# Patient Record
Sex: FEMALE | Race: WHITE | NOT HISPANIC OR LATINO | Employment: OTHER | URBAN - METROPOLITAN AREA
[De-identification: names, ages, dates, MRNs, and addresses within clinical notes are randomized per-mention and may not be internally consistent; named-entity substitution may affect disease eponyms.]

---

## 2023-10-26 ENCOUNTER — OFFICE VISIT (OUTPATIENT)
Dept: SURGICAL ONCOLOGY | Facility: CLINIC | Age: 68
End: 2023-10-26
Payer: COMMERCIAL

## 2023-10-26 VITALS
TEMPERATURE: 97.9 F | OXYGEN SATURATION: 99 % | HEART RATE: 68 BPM | BODY MASS INDEX: 24.34 KG/M2 | SYSTOLIC BLOOD PRESSURE: 135 MMHG | WEIGHT: 137.35 LBS | RESPIRATION RATE: 16 BRPM | HEIGHT: 63 IN | DIASTOLIC BLOOD PRESSURE: 83 MMHG

## 2023-10-26 DIAGNOSIS — C43.71: ICD-10-CM

## 2023-10-26 PROCEDURE — 1036F TOBACCO NON-USER: CPT | Performed by: SURGERY

## 2023-10-26 PROCEDURE — 99205 OFFICE O/P NEW HI 60 MIN: CPT | Performed by: SURGERY

## 2023-10-26 PROCEDURE — 99215 OFFICE O/P EST HI 40 MIN: CPT | Performed by: SURGERY

## 2023-10-26 PROCEDURE — 1126F AMNT PAIN NOTED NONE PRSNT: CPT | Performed by: SURGERY

## 2023-10-26 RX ORDER — CALCIUM CARBONATE 500(1250)
1 TABLET ORAL
COMMUNITY

## 2023-10-26 RX ORDER — BISMUTH SUBSALICYLATE 262 MG
1 TABLET,CHEWABLE ORAL DAILY
COMMUNITY

## 2023-10-26 RX ORDER — AMLODIPINE BESYLATE 10 MG/1
1 TABLET ORAL DAILY
COMMUNITY

## 2023-10-26 RX ORDER — METOPROLOL SUCCINATE 50 MG/1
50 TABLET, EXTENDED RELEASE ORAL DAILY
COMMUNITY

## 2023-10-26 ASSESSMENT — ENCOUNTER SYMPTOMS
VOMITING: 0
WOUND: 1
WEAKNESS: 0
DYSURIA: 0
ENDOCRINE NEGATIVE: 1
CONSTITUTIONAL NEGATIVE: 1
ADENOPATHY: 0
HEADACHES: 0
NERVOUS/ANXIOUS: 1
FLANK PAIN: 0
LOSS OF SENSATION IN FEET: 0
SORE THROAT: 0
EYE DISCHARGE: 0
ABDOMINAL PAIN: 0
SHORTNESS OF BREATH: 0
DEPRESSION: 0
NAUSEA: 0
OCCASIONAL FEELINGS OF UNSTEADINESS: 0
COLOR CHANGE: 1
HALLUCINATIONS: 0
CONFUSION: 0
SPEECH DIFFICULTY: 0

## 2023-10-26 ASSESSMENT — PAIN SCALES - GENERAL: PAINLEVEL: 0-NO PAIN
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
## 2023-10-27 ENCOUNTER — LAB REQUISITION (OUTPATIENT)
Dept: LAB | Facility: HOSPITAL | Age: 68
End: 2023-10-27
Payer: COMMERCIAL

## 2023-10-27 DIAGNOSIS — L98.9 DISORDER OF THE SKIN AND SUBCUTANEOUS TISSUE, UNSPECIFIED: ICD-10-CM

## 2023-10-27 PROCEDURE — 88321 CONSLTJ&REPRT SLD PREP ELSWR: CPT | Performed by: DERMATOLOGY

## 2023-10-27 PROCEDURE — 88321 CONSLTJ&REPRT SLD PREP ELSWR: CPT

## 2023-10-31 LAB
PATH REPORT.FINAL DX SPEC: NORMAL
PATH REPORT.GROSS SPEC: NORMAL
PATH REPORT.RELEVANT HX SPEC: NORMAL
PATH REPORT.TOTAL CANCER: NORMAL
PATHOLOGY SYNOPTIC REPORT: NORMAL

## 2023-11-01 DIAGNOSIS — C43.9 MELANOMA OF SKIN (MULTI): Primary | ICD-10-CM

## 2023-11-01 NOTE — TUMOR BOARD NOTE
General Patient Information  Name:  Ciara Montes De Oca  Evaluation #:  1  Conference Date:  11-6-2023  YOB: 1955  MRN:  [id number]  Program Physician(s):  Dillon Torrez  Referring Physician(s):  Oh Arciniega (Podiatry), Krzysztof Garcia      Summary   Stage:  cIIA ([id number])   Melanoma 5 year survival: 94%    Assessment:  Right plantar heel with an ulcerated acral melanoma, Breslow: at least 1.3 mm, broadly transected on the deep margin.    Recommendation:  WLE with 1-2 cm margins and SLNB.  Consider 2 cm as broadly transected.  Not eligible for [id number] study due to history of breast cancer.    Review Multidisciplinary Cutaneous Oncology Conference recommendation with patient.  Continue routine follow up and total body skin exams with Dermatology.    Follow Up:  Oh Arciniega (Podiatry), Krzysztof Garcia, Dermatology      History and Physical Exam  Dermatologic History:   68 y.o. female with a biopsy of the right plantar heel on 10- showing an ulcerated melanoma, acral type, Breslow: at least 1.3 mm, broadly transected on the deep margin.    Past Medical History:  Breast cancer in 2010    Family History of DNS/MM:  Unknown    Skin:  There is a large darkly discolored lesion encompassing the plantar aspect of the heel with a fungating bleeding mass.    Lymphatic:  No palpable lymph nodes in the right inguinal or popliteal regions.      Pathology  Derm Consult: [id number]  4 SLIDES, [facility], [id number], 10/16/23     SKIN AND SUBCUTANEOUS TISSUE, RIGHT PLANTAR HEEL, BIOPSY:  ULCER AND MALIGNANT MELANOMA, BRESLOW THICKNESS AT LEAST 1.3 MM, PRESENT ON THE DEEP AND PERIPHERAL MARGIN, SEE NOTE.     Note: Microscopic examination reveals a specimen that extends into the dermis. There is an ulcer and there are nests of atypical epithelioid cells with melanin pigmentation. These cells stain strongly with antibodies against SOX-10 and Melan-A, and the pigment stains with a  [place] stain.     ** Electronically signed out by Gaudencio Woods MD **     MELANOMA OF THE SKIN: Biopsy   8th Edition - Protocol posted: 3/23/2022MELANOMA OF THE SKIN: BIOPSY - All Specimens  SPECIMEN   Procedure  Not specified   Specimen Laterality  Right   TUMOR   Tumor Site  Skin of lower limb and hip: Right plantar heel        Histologic Type  Acral melanoma   Maximum Tumor (Breslow) Thickness (Millimeters)  At least: 1.3 mm     Broadly transected on the deep margin.   Ulceration  Present   Extent of Ulceration (Millimeters)  4.5 mm   Anatomic ([place]) Level  At least level: IV     Broadly transected on the deep margin.   Mitotic Rate  2 mitoses per mm2   Microsatellite(s)  Not identified   Lymphovascular Invasion  Not identified   Neurotropism  Not identified   Tumor-Infiltrating Lymphocytes  Present, nonbrisk   Tumor Regression  Not identified   MARGINS     Margin Status for Invasive Melanoma  Invasive melanoma present at margin   Margin(s) Involved by Invasive Melanoma  Peripheral     Deep   Margin Status for Melanoma in situ  All margins negative for melanoma in situ   PATHOLOGIC STAGE CLASSIFICATION (pTNM, AJCC 8th Edition)     pT Category  pT2b

## 2023-11-06 ENCOUNTER — TUMOR BOARD CONFERENCE (OUTPATIENT)
Dept: HEMATOLOGY/ONCOLOGY | Facility: HOSPITAL | Age: 68
End: 2023-11-06
Payer: COMMERCIAL

## 2023-11-16 ENCOUNTER — PREP FOR PROCEDURE (OUTPATIENT)
Dept: SURGICAL ONCOLOGY | Facility: HOSPITAL | Age: 68
End: 2023-11-16
Payer: COMMERCIAL

## 2023-11-16 DIAGNOSIS — C43.71: Primary | ICD-10-CM

## 2023-11-16 DIAGNOSIS — C43.71: ICD-10-CM

## 2023-11-16 RX ORDER — CEFAZOLIN SODIUM 2 G/100ML
2 INJECTION, SOLUTION INTRAVENOUS EVERY 8 HOURS
Status: CANCELLED | OUTPATIENT
Start: 2023-11-16

## 2023-11-16 RX ORDER — HEPARIN SODIUM 5000 [USP'U]/ML
5000 INJECTION, SOLUTION INTRAVENOUS; SUBCUTANEOUS ONCE
Status: CANCELLED | OUTPATIENT
Start: 2023-11-16 | End: 2023-11-16

## 2023-11-16 NOTE — H&P
Since her visit with me, the patient underwent MRI brain and PET scan which showed no evidence of distant metastatic disease.  I also discussed her case with Dr. Jose Gutierrez regarding any reconstructive options, and he recommended given the location that a more formal reconstruction be performed rather than just allowing for secondary intention healing.  Therefore, we will plan for wide excision and sentinel lymph node biopsy on 11/22/2023, and in the same setting Dr. Gutierrez will do the first phase of the 2 phase reconstruction.  We will also arrange clinic appointment with Dr. Gutierrez.      Juve Millan MD

## 2023-11-17 DIAGNOSIS — C43.71: ICD-10-CM

## 2023-11-20 ENCOUNTER — OFFICE VISIT (OUTPATIENT)
Dept: PLASTIC SURGERY | Facility: CLINIC | Age: 68
End: 2023-11-20
Payer: COMMERCIAL

## 2023-11-20 VITALS
HEIGHT: 62 IN | BODY MASS INDEX: 25.58 KG/M2 | WEIGHT: 139 LBS | HEART RATE: 68 BPM | DIASTOLIC BLOOD PRESSURE: 85 MMHG | SYSTOLIC BLOOD PRESSURE: 154 MMHG

## 2023-11-20 DIAGNOSIS — C43.71: Primary | ICD-10-CM

## 2023-11-20 PROCEDURE — 1159F MED LIST DOCD IN RCRD: CPT | Performed by: SURGERY

## 2023-11-20 PROCEDURE — 1036F TOBACCO NON-USER: CPT | Performed by: SURGERY

## 2023-11-20 PROCEDURE — 1126F AMNT PAIN NOTED NONE PRSNT: CPT | Performed by: SURGERY

## 2023-11-20 PROCEDURE — 99215 OFFICE O/P EST HI 40 MIN: CPT | Performed by: SURGERY
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
## 2023-11-21 ENCOUNTER — ANESTHESIA EVENT (OUTPATIENT)
Dept: OPERATING ROOM | Facility: HOSPITAL | Age: 68
End: 2023-11-21
Payer: COMMERCIAL

## 2023-11-21 DIAGNOSIS — C43.71: ICD-10-CM

## 2023-11-22 ENCOUNTER — HOSPITAL ENCOUNTER (OUTPATIENT)
Facility: HOSPITAL | Age: 68
Setting detail: OUTPATIENT SURGERY
Discharge: HOME | End: 2023-11-22
Attending: SURGERY | Admitting: SURGERY
Payer: COMMERCIAL

## 2023-11-22 ENCOUNTER — HOSPITAL ENCOUNTER (OUTPATIENT)
Dept: RADIOLOGY | Facility: HOSPITAL | Age: 68
Setting detail: OUTPATIENT SURGERY
Discharge: HOME | End: 2023-11-22
Payer: COMMERCIAL

## 2023-11-22 ENCOUNTER — ANESTHESIA (OUTPATIENT)
Dept: OPERATING ROOM | Facility: HOSPITAL | Age: 68
End: 2023-11-22
Payer: COMMERCIAL

## 2023-11-22 VITALS
BODY MASS INDEX: 25.56 KG/M2 | TEMPERATURE: 96.3 F | WEIGHT: 138.89 LBS | SYSTOLIC BLOOD PRESSURE: 137 MMHG | OXYGEN SATURATION: 95 % | DIASTOLIC BLOOD PRESSURE: 76 MMHG | HEIGHT: 62 IN | RESPIRATION RATE: 14 BRPM | HEART RATE: 70 BPM

## 2023-11-22 DIAGNOSIS — C43.71: ICD-10-CM

## 2023-11-22 DIAGNOSIS — C43.71: Primary | ICD-10-CM

## 2023-11-22 PROBLEM — I10 PRIMARY HYPERTENSION: Status: ACTIVE | Noted: 2023-11-22

## 2023-11-22 LAB
ABO GROUP (TYPE) IN BLOOD: NORMAL
ABO GROUP (TYPE) IN BLOOD: NORMAL
ANION GAP BLDA CALCULATED.4IONS-SCNC: 8 MMO/L (ref 10–25)
ANTIBODY SCREEN: NORMAL
ANTIBODY SCREEN: NORMAL
BASE EXCESS BLDA CALC-SCNC: -0.2 MMOL/L (ref -2–3)
BODY TEMPERATURE: 37 DEGREES CELSIUS
CA-I BLDA-SCNC: 1.29 MMOL/L (ref 1.1–1.33)
CHLORIDE BLDA-SCNC: 108 MMOL/L (ref 98–107)
GLUCOSE BLDA-MCNC: 105 MG/DL (ref 74–99)
HCO3 BLDA-SCNC: 24.8 MMOL/L (ref 22–26)
HCT VFR BLD EST: 36 % (ref 36–46)
HGB BLDA-MCNC: 12 G/DL (ref 12–16)
INHALED O2 CONCENTRATION: 50 %
LACTATE BLDA-SCNC: 1 MMOL/L (ref 0.4–2)
OXYHGB MFR BLDA: 97.8 % (ref 94–98)
PCO2 BLDA: 41 MM HG (ref 38–42)
PH BLDA: 7.39 PH (ref 7.38–7.42)
PO2 BLDA: 171 MM HG (ref 85–95)
POTASSIUM BLDA-SCNC: 3.7 MMOL/L (ref 3.5–5.3)
RH FACTOR (ANTIGEN D): NORMAL
RH FACTOR (ANTIGEN D): NORMAL
SAO2 % BLDA: 100 % (ref 94–100)
SODIUM BLDA-SCNC: 137 MMOL/L (ref 136–145)

## 2023-11-22 PROCEDURE — A11606 PR EXC SKIN MALIG >4 CM TRUNK,ARM,LEG: Performed by: STUDENT IN AN ORGANIZED HEALTH CARE EDUCATION/TRAINING PROGRAM

## 2023-11-22 PROCEDURE — 84132 ASSAY OF SERUM POTASSIUM: CPT

## 2023-11-22 PROCEDURE — 78830 RP LOCLZJ TUM SPECT W/CT 1: CPT

## 2023-11-22 PROCEDURE — 86901 BLOOD TYPING SEROLOGIC RH(D): CPT | Performed by: SURGERY

## 2023-11-22 PROCEDURE — 38500 BIOPSY/REMOVAL LYMPH NODES: CPT | Performed by: SURGERY

## 2023-11-22 PROCEDURE — 64708 REVISE ARM/LEG NERVE: CPT | Performed by: SURGERY

## 2023-11-22 PROCEDURE — A4217 STERILE WATER/SALINE, 500 ML: HCPCS | Performed by: SURGERY

## 2023-11-22 PROCEDURE — 3700000001 HC GENERAL ANESTHESIA TIME - INITIAL BASE CHARGE: Performed by: SURGERY

## 2023-11-22 PROCEDURE — 86900 BLOOD TYPING SEROLOGIC ABO: CPT | Performed by: STUDENT IN AN ORGANIZED HEALTH CARE EDUCATION/TRAINING PROGRAM

## 2023-11-22 PROCEDURE — 36415 COLL VENOUS BLD VENIPUNCTURE: CPT | Performed by: STUDENT IN AN ORGANIZED HEALTH CARE EDUCATION/TRAINING PROGRAM

## 2023-11-22 PROCEDURE — 2500000004 HC RX 250 GENERAL PHARMACY W/ HCPCS (ALT 636 FOR OP/ED): Performed by: SURGERY

## 2023-11-22 PROCEDURE — 78195 LYMPH SYSTEM IMAGING: CPT | Mod: 59

## 2023-11-22 PROCEDURE — 15572 SKIN PEDICLE FLAP ARMS/LEGS: CPT | Performed by: SURGERY

## 2023-11-22 PROCEDURE — 88341 IMHCHEM/IMCYTCHM EA ADD ANTB: CPT | Performed by: PATHOLOGY

## 2023-11-22 PROCEDURE — 96372 THER/PROPH/DIAG INJ SC/IM: CPT | Performed by: SURGERY

## 2023-11-22 PROCEDURE — 88307 TISSUE EXAM BY PATHOLOGIST: CPT | Mod: TC,SUR | Performed by: SURGERY

## 2023-11-22 PROCEDURE — 88342 IMHCHEM/IMCYTCHM 1ST ANTB: CPT | Performed by: PATHOLOGY

## 2023-11-22 PROCEDURE — 3700000002 HC GENERAL ANESTHESIA TIME - EACH INCREMENTAL 1 MINUTE: Performed by: SURGERY

## 2023-11-22 PROCEDURE — 88307 TISSUE EXAM BY PATHOLOGIST: CPT | Performed by: PATHOLOGY

## 2023-11-22 PROCEDURE — 38531 OPEN BX/EXC INGUINOFEM NODES: CPT | Performed by: SURGERY

## 2023-11-22 PROCEDURE — 7100000009 HC PHASE TWO TIME - INITIAL BASE CHARGE: Performed by: SURGERY

## 2023-11-22 PROCEDURE — 7100000010 HC PHASE TWO TIME - EACH INCREMENTAL 1 MINUTE: Performed by: SURGERY

## 2023-11-22 PROCEDURE — 2780000003 HC OR 278 NO HCPCS: Performed by: SURGERY

## 2023-11-22 PROCEDURE — 7100000002 HC RECOVERY ROOM TIME - EACH INCREMENTAL 1 MINUTE: Performed by: SURGERY

## 2023-11-22 PROCEDURE — 2500000004 HC RX 250 GENERAL PHARMACY W/ HCPCS (ALT 636 FOR OP/ED)

## 2023-11-22 PROCEDURE — 3600000003 HC OR TIME - INITIAL BASE CHARGE - PROCEDURE LEVEL THREE: Performed by: SURGERY

## 2023-11-22 PROCEDURE — 88305 TISSUE EXAM BY PATHOLOGIST: CPT | Performed by: PATHOLOGY

## 2023-11-22 PROCEDURE — 2500000005 HC RX 250 GENERAL PHARMACY W/O HCPCS

## 2023-11-22 PROCEDURE — 3430000001 HC RX 343 DIAGNOSTIC RADIOPHARMACEUTICALS: Performed by: SURGERY

## 2023-11-22 PROCEDURE — 2720000007 HC OR 272 NO HCPCS: Performed by: SURGERY

## 2023-11-22 PROCEDURE — 7100000001 HC RECOVERY ROOM TIME - INITIAL BASE CHARGE: Performed by: SURGERY

## 2023-11-22 PROCEDURE — 78830 RP LOCLZJ TUM SPECT W/CT 1: CPT | Performed by: STUDENT IN AN ORGANIZED HEALTH CARE EDUCATION/TRAINING PROGRAM

## 2023-11-22 PROCEDURE — 15276 SKIN SUB GRAFT F/N/HF/G ADDL: CPT | Performed by: SURGERY

## 2023-11-22 PROCEDURE — 15738 MUSCLE-SKIN GRAFT LEG: CPT | Performed by: SURGERY

## 2023-11-22 PROCEDURE — 38900 IO MAP OF SENT LYMPH NODE: CPT | Performed by: SURGERY

## 2023-11-22 PROCEDURE — 3600000008 HC OR TIME - EACH INCREMENTAL 1 MINUTE - PROCEDURE LEVEL THREE: Performed by: SURGERY

## 2023-11-22 PROCEDURE — A9520 TC99 TILMANOCEPT DIAG 0.5MCI: HCPCS | Performed by: SURGERY

## 2023-11-22 PROCEDURE — 2500000005 HC RX 250 GENERAL PHARMACY W/O HCPCS: Performed by: STUDENT IN AN ORGANIZED HEALTH CARE EDUCATION/TRAINING PROGRAM

## 2023-11-22 PROCEDURE — 15275 SKIN SUB GRAFT FACE/NK/HF/G: CPT | Performed by: SURGERY

## 2023-11-22 PROCEDURE — 11606 EXC TR-EXT MAL+MARG >4 CM: CPT | Performed by: SURGERY

## 2023-11-22 PROCEDURE — 2500000001 HC RX 250 WO HCPCS SELF ADMINISTERED DRUGS (ALT 637 FOR MEDICARE OP)

## 2023-11-22 PROCEDURE — 36620 INSERTION CATHETER ARTERY: CPT

## 2023-11-22 PROCEDURE — 2500000004 HC RX 250 GENERAL PHARMACY W/ HCPCS (ALT 636 FOR OP/ED): Performed by: STUDENT IN AN ORGANIZED HEALTH CARE EDUCATION/TRAINING PROGRAM

## 2023-11-22 DEVICE — INTEGRA® MESHED BILAYER WOUND MATRIX 8 IN*10 IN (20 CM*25 CM)
Type: IMPLANTABLE DEVICE | Site: HEEL | Status: FUNCTIONAL
Brand: INTEGRA®

## 2023-11-22 RX ORDER — HEPARIN SODIUM 5000 [USP'U]/ML
5000 INJECTION, SOLUTION INTRAVENOUS; SUBCUTANEOUS ONCE
Status: COMPLETED | OUTPATIENT
Start: 2023-11-22 | End: 2023-11-22

## 2023-11-22 RX ORDER — LABETALOL HYDROCHLORIDE 5 MG/ML
5 INJECTION, SOLUTION INTRAVENOUS ONCE AS NEEDED
Status: DISCONTINUED | OUTPATIENT
Start: 2023-11-22 | End: 2023-11-22

## 2023-11-22 RX ORDER — ACETAMINOPHEN 325 MG/1
650 TABLET ORAL EVERY 4 HOURS PRN
Qty: 60 TABLET | Refills: 0 | Status: SHIPPED | OUTPATIENT
Start: 2023-11-22 | End: 2023-12-07

## 2023-11-22 RX ORDER — FENTANYL CITRATE 50 UG/ML
INJECTION, SOLUTION INTRAMUSCULAR; INTRAVENOUS AS NEEDED
Status: DISCONTINUED | OUTPATIENT
Start: 2023-11-22 | End: 2023-11-22

## 2023-11-22 RX ORDER — HYDROMORPHONE HYDROCHLORIDE 1 MG/ML
0.2 INJECTION, SOLUTION INTRAMUSCULAR; INTRAVENOUS; SUBCUTANEOUS EVERY 5 MIN PRN
Status: DISCONTINUED | OUTPATIENT
Start: 2023-11-22 | End: 2023-11-22 | Stop reason: HOSPADM

## 2023-11-22 RX ORDER — ONDANSETRON HYDROCHLORIDE 2 MG/ML
INJECTION, SOLUTION INTRAVENOUS AS NEEDED
Status: DISCONTINUED | OUTPATIENT
Start: 2023-11-22 | End: 2023-11-22

## 2023-11-22 RX ORDER — SODIUM CHLORIDE 0.9 G/100ML
IRRIGANT IRRIGATION AS NEEDED
Status: DISCONTINUED | OUTPATIENT
Start: 2023-11-22 | End: 2023-11-22 | Stop reason: HOSPADM

## 2023-11-22 RX ORDER — SODIUM CHLORIDE, SODIUM LACTATE, POTASSIUM CHLORIDE, CALCIUM CHLORIDE 600; 310; 30; 20 MG/100ML; MG/100ML; MG/100ML; MG/100ML
INJECTION, SOLUTION INTRAVENOUS CONTINUOUS PRN
Status: DISCONTINUED | OUTPATIENT
Start: 2023-11-22 | End: 2023-11-22

## 2023-11-22 RX ORDER — LIDOCAINE HYDROCHLORIDE 20 MG/ML
INJECTION, SOLUTION INFILTRATION; PERINEURAL AS NEEDED
Status: DISCONTINUED | OUTPATIENT
Start: 2023-11-22 | End: 2023-11-22

## 2023-11-22 RX ORDER — HYDROMORPHONE HYDROCHLORIDE 1 MG/ML
0.1 INJECTION, SOLUTION INTRAMUSCULAR; INTRAVENOUS; SUBCUTANEOUS EVERY 5 MIN PRN
Status: DISCONTINUED | OUTPATIENT
Start: 2023-11-22 | End: 2023-11-22 | Stop reason: HOSPADM

## 2023-11-22 RX ORDER — DEXAMETHASONE SODIUM PHOSPHATE 4 MG/ML
INJECTION, SOLUTION INTRA-ARTICULAR; INTRALESIONAL; INTRAMUSCULAR; INTRAVENOUS; SOFT TISSUE AS NEEDED
Status: DISCONTINUED | OUTPATIENT
Start: 2023-11-22 | End: 2023-11-22

## 2023-11-22 RX ORDER — MIDAZOLAM HYDROCHLORIDE 1 MG/ML
INJECTION, SOLUTION INTRAMUSCULAR; INTRAVENOUS AS NEEDED
Status: DISCONTINUED | OUTPATIENT
Start: 2023-11-22 | End: 2023-11-22

## 2023-11-22 RX ORDER — ROCURONIUM BROMIDE 10 MG/ML
INJECTION, SOLUTION INTRAVENOUS AS NEEDED
Status: DISCONTINUED | OUTPATIENT
Start: 2023-11-22 | End: 2023-11-22

## 2023-11-22 RX ORDER — LIDOCAINE HYDROCHLORIDE 10 MG/ML
0.1 INJECTION INFILTRATION; PERINEURAL ONCE
Status: DISCONTINUED | OUTPATIENT
Start: 2023-11-22 | End: 2023-11-22 | Stop reason: HOSPADM

## 2023-11-22 RX ORDER — PROPOFOL 10 MG/ML
INJECTION, EMULSION INTRAVENOUS AS NEEDED
Status: DISCONTINUED | OUTPATIENT
Start: 2023-11-22 | End: 2023-11-22

## 2023-11-22 RX ORDER — PHENYLEPHRINE HCL IN 0.9% NACL 0.4MG/10ML
SYRINGE (ML) INTRAVENOUS AS NEEDED
Status: DISCONTINUED | OUTPATIENT
Start: 2023-11-22 | End: 2023-11-22

## 2023-11-22 RX ORDER — OXYCODONE HYDROCHLORIDE 5 MG/1
5 TABLET ORAL EVERY 4 HOURS PRN
Qty: 15 TABLET | Refills: 0 | Status: SHIPPED | OUTPATIENT
Start: 2023-11-22 | End: 2023-11-29

## 2023-11-22 RX ORDER — BUPIVACAINE HYDROCHLORIDE 5 MG/ML
INJECTION, SOLUTION EPIDURAL; INTRACAUDAL AS NEEDED
Status: DISCONTINUED | OUTPATIENT
Start: 2023-11-22 | End: 2023-11-22 | Stop reason: HOSPADM

## 2023-11-22 RX ORDER — HYDRALAZINE HYDROCHLORIDE 20 MG/ML
5 INJECTION INTRAMUSCULAR; INTRAVENOUS EVERY 30 MIN PRN
Status: DISCONTINUED | OUTPATIENT
Start: 2023-11-22 | End: 2023-11-22 | Stop reason: HOSPADM

## 2023-11-22 RX ORDER — HYDROMORPHONE HYDROCHLORIDE 1 MG/ML
0.5 INJECTION, SOLUTION INTRAMUSCULAR; INTRAVENOUS; SUBCUTANEOUS EVERY 5 MIN PRN
Status: DISCONTINUED | OUTPATIENT
Start: 2023-11-22 | End: 2023-11-22 | Stop reason: HOSPADM

## 2023-11-22 RX ORDER — SODIUM CHLORIDE, SODIUM LACTATE, POTASSIUM CHLORIDE, CALCIUM CHLORIDE 600; 310; 30; 20 MG/100ML; MG/100ML; MG/100ML; MG/100ML
100 INJECTION, SOLUTION INTRAVENOUS CONTINUOUS
Status: DISCONTINUED | OUTPATIENT
Start: 2023-11-22 | End: 2023-11-22 | Stop reason: HOSPADM

## 2023-11-22 RX ORDER — CEFAZOLIN SODIUM 2 G/100ML
2 INJECTION, SOLUTION INTRAVENOUS EVERY 8 HOURS
Status: DISCONTINUED | OUTPATIENT
Start: 2023-11-22 | End: 2023-11-22 | Stop reason: HOSPADM

## 2023-11-22 RX ORDER — ONDANSETRON HYDROCHLORIDE 2 MG/ML
4 INJECTION, SOLUTION INTRAVENOUS ONCE AS NEEDED
Status: DISCONTINUED | OUTPATIENT
Start: 2023-11-22 | End: 2023-11-22 | Stop reason: HOSPADM

## 2023-11-22 RX ORDER — HYDROMORPHONE HYDROCHLORIDE 1 MG/ML
INJECTION, SOLUTION INTRAMUSCULAR; INTRAVENOUS; SUBCUTANEOUS AS NEEDED
Status: DISCONTINUED | OUTPATIENT
Start: 2023-11-22 | End: 2023-11-22

## 2023-11-22 RX ORDER — ALBUTEROL SULFATE 0.83 MG/ML
2.5 SOLUTION RESPIRATORY (INHALATION) ONCE AS NEEDED
Status: DISCONTINUED | OUTPATIENT
Start: 2023-11-22 | End: 2023-11-22 | Stop reason: HOSPADM

## 2023-11-22 RX ORDER — OXYCODONE HYDROCHLORIDE 5 MG/1
5 TABLET ORAL ONCE
Status: COMPLETED | OUTPATIENT
Start: 2023-11-22 | End: 2023-11-22

## 2023-11-22 RX ORDER — CEFAZOLIN 1 G/1
INJECTION, POWDER, FOR SOLUTION INTRAVENOUS AS NEEDED
Status: DISCONTINUED | OUTPATIENT
Start: 2023-11-22 | End: 2023-11-22

## 2023-11-22 RX ADMIN — ONDANSETRON 4 MG: 2 INJECTION, SOLUTION INTRAMUSCULAR; INTRAVENOUS at 16:26

## 2023-11-22 RX ADMIN — Medication 40 MCG: at 12:52

## 2023-11-22 RX ADMIN — ROCURONIUM BROMIDE 50 MG: 10 INJECTION, SOLUTION INTRAVENOUS at 11:48

## 2023-11-22 RX ADMIN — PROPOFOL 20 MG: 10 INJECTION, EMULSION INTRAVENOUS at 14:30

## 2023-11-22 RX ADMIN — FENTANYL CITRATE 75 MCG: 50 INJECTION, SOLUTION INTRAMUSCULAR; INTRAVENOUS at 11:48

## 2023-11-22 RX ADMIN — FENTANYL CITRATE 25 MCG: 50 INJECTION, SOLUTION INTRAMUSCULAR; INTRAVENOUS at 13:56

## 2023-11-22 RX ADMIN — ROCURONIUM BROMIDE 20 MG: 10 INJECTION, SOLUTION INTRAVENOUS at 15:14

## 2023-11-22 RX ADMIN — ROCURONIUM BROMIDE 10 MG: 10 INJECTION, SOLUTION INTRAVENOUS at 13:03

## 2023-11-22 RX ADMIN — HYDROMORPHONE HYDROCHLORIDE 0.5 MG: 1 INJECTION, SOLUTION INTRAMUSCULAR; INTRAVENOUS; SUBCUTANEOUS at 18:31

## 2023-11-22 RX ADMIN — HYDROMORPHONE HYDROCHLORIDE 0.2 MG: 1 INJECTION, SOLUTION INTRAMUSCULAR; INTRAVENOUS; SUBCUTANEOUS at 15:33

## 2023-11-22 RX ADMIN — Medication 25 MG: at 14:02

## 2023-11-22 RX ADMIN — SUGAMMADEX 200 MG: 100 INJECTION, SOLUTION INTRAVENOUS at 16:40

## 2023-11-22 RX ADMIN — FENTANYL CITRATE 25 MCG: 50 INJECTION, SOLUTION INTRAMUSCULAR; INTRAVENOUS at 08:43

## 2023-11-22 RX ADMIN — Medication 80 MCG: at 16:04

## 2023-11-22 RX ADMIN — PROPOFOL 10 MG: 10 INJECTION, EMULSION INTRAVENOUS at 13:57

## 2023-11-22 RX ADMIN — LIDOCAINE HYDROCHLORIDE 60 ML: 20 INJECTION, SOLUTION INFILTRATION; PERINEURAL at 11:48

## 2023-11-22 RX ADMIN — TILMANOCEPT 511 MILLICURIE: KIT at 08:58

## 2023-11-22 RX ADMIN — PROPOFOL 40 MG: 10 INJECTION, EMULSION INTRAVENOUS at 08:44

## 2023-11-22 RX ADMIN — FENTANYL CITRATE 25 MCG: 50 INJECTION, SOLUTION INTRAMUSCULAR; INTRAVENOUS at 08:39

## 2023-11-22 RX ADMIN — HYDROMORPHONE HYDROCHLORIDE 0.2 MG: 1 INJECTION, SOLUTION INTRAMUSCULAR; INTRAVENOUS; SUBCUTANEOUS at 15:10

## 2023-11-22 RX ADMIN — PROPOFOL 30 MG: 10 INJECTION, EMULSION INTRAVENOUS at 14:33

## 2023-11-22 RX ADMIN — HYDROMORPHONE HYDROCHLORIDE 0.2 MG: 1 INJECTION, SOLUTION INTRAMUSCULAR; INTRAVENOUS; SUBCUTANEOUS at 14:49

## 2023-11-22 RX ADMIN — SODIUM CHLORIDE, POTASSIUM CHLORIDE, SODIUM LACTATE AND CALCIUM CHLORIDE: 600; 310; 30; 20 INJECTION, SOLUTION INTRAVENOUS at 08:32

## 2023-11-22 RX ADMIN — MIDAZOLAM 2 MG: 1 INJECTION INTRAMUSCULAR; INTRAVENOUS at 08:40

## 2023-11-22 RX ADMIN — FENTANYL CITRATE 25 MCG: 50 INJECTION, SOLUTION INTRAMUSCULAR; INTRAVENOUS at 12:26

## 2023-11-22 RX ADMIN — PROPOFOL 30 MG: 10 INJECTION, EMULSION INTRAVENOUS at 14:36

## 2023-11-22 RX ADMIN — SODIUM CHLORIDE, POTASSIUM CHLORIDE, SODIUM LACTATE AND CALCIUM CHLORIDE: 600; 310; 30; 20 INJECTION, SOLUTION INTRAVENOUS at 14:07

## 2023-11-22 RX ADMIN — PROPOFOL 50 MG: 10 INJECTION, EMULSION INTRAVENOUS at 13:25

## 2023-11-22 RX ADMIN — DEXAMETHASONE SODIUM PHOSPHATE 4 MG: 4 INJECTION, SOLUTION INTRAMUSCULAR; INTRAVENOUS at 12:23

## 2023-11-22 RX ADMIN — OXYCODONE HYDROCHLORIDE 5 MG: 5 TABLET ORAL at 19:30

## 2023-11-22 RX ADMIN — HYDROMORPHONE HYDROCHLORIDE 0.2 MG: 1 INJECTION, SOLUTION INTRAMUSCULAR; INTRAVENOUS; SUBCUTANEOUS at 16:23

## 2023-11-22 RX ADMIN — FENTANYL CITRATE 25 MCG: 50 INJECTION, SOLUTION INTRAMUSCULAR; INTRAVENOUS at 08:41

## 2023-11-22 RX ADMIN — Medication 2 L/MIN: at 17:45

## 2023-11-22 RX ADMIN — HEPARIN SODIUM 5000 UNITS: 5000 INJECTION, SOLUTION INTRAVENOUS; SUBCUTANEOUS at 11:58

## 2023-11-22 RX ADMIN — ROCURONIUM BROMIDE 10 MG: 10 INJECTION, SOLUTION INTRAVENOUS at 14:29

## 2023-11-22 RX ADMIN — Medication 15 MG: at 12:48

## 2023-11-22 RX ADMIN — Medication 120 MCG: at 13:39

## 2023-11-22 RX ADMIN — HYDROMORPHONE HYDROCHLORIDE 0.2 MG: 1 INJECTION, SOLUTION INTRAMUSCULAR; INTRAVENOUS; SUBCUTANEOUS at 18:55

## 2023-11-22 RX ADMIN — FENTANYL CITRATE 100 MCG: 50 INJECTION, SOLUTION INTRAMUSCULAR; INTRAVENOUS at 14:30

## 2023-11-22 RX ADMIN — Medication 10 MG: at 15:10

## 2023-11-22 RX ADMIN — PROPOFOL 100 MG: 10 INJECTION, EMULSION INTRAVENOUS at 11:48

## 2023-11-22 RX ADMIN — PROPOFOL 20 MG: 10 INJECTION, EMULSION INTRAVENOUS at 16:24

## 2023-11-22 RX ADMIN — CEFAZOLIN 2 G: 1 INJECTION, POWDER, FOR SOLUTION INTRAMUSCULAR; INTRAVENOUS at 15:57

## 2023-11-22 RX ADMIN — CEFAZOLIN 2 G: 1 INJECTION, POWDER, FOR SOLUTION INTRAMUSCULAR; INTRAVENOUS at 12:04

## 2023-11-22 SDOH — HEALTH STABILITY: MENTAL HEALTH: CURRENT SMOKER: 0

## 2023-11-22 ASSESSMENT — PAIN - FUNCTIONAL ASSESSMENT
PAIN_FUNCTIONAL_ASSESSMENT: 0-10
PAIN_FUNCTIONAL_ASSESSMENT: FLACC (FACE, LEGS, ACTIVITY, CRY, CONSOLABILITY)

## 2023-11-22 ASSESSMENT — PAIN SCALES - GENERAL
PAINLEVEL_OUTOF10: 7
PAINLEVEL_OUTOF10: 2
PAINLEVEL_OUTOF10: 2
PAINLEVEL_OUTOF10: 5 - MODERATE PAIN
PAINLEVEL_OUTOF10: 0 - NO PAIN
PAINLEVEL_OUTOF10: 3

## 2023-11-22 ASSESSMENT — COLUMBIA-SUICIDE SEVERITY RATING SCALE - C-SSRS
2. HAVE YOU ACTUALLY HAD ANY THOUGHTS OF KILLING YOURSELF?: NO
1. IN THE PAST MONTH, HAVE YOU WISHED YOU WERE DEAD OR WISHED YOU COULD GO TO SLEEP AND NOT WAKE UP?: NO
6. HAVE YOU EVER DONE ANYTHING, STARTED TO DO ANYTHING, OR PREPARED TO DO ANYTHING TO END YOUR LIFE?: NO

## 2023-11-22 ASSESSMENT — PAIN DESCRIPTION - DESCRIPTORS: DESCRIPTORS: ACHING

## 2023-11-22 NOTE — OP NOTE
OPERATIVE NOTE     Date: 2023  OR Location: [facility] OR    Name: Ciara Montes De Oca, : 1955, Age: 68 y.o., MRN: [id number], Sex: female    Diagnosis  Pre-op Diagnosis     * Melanoma of foot, right (CMS/HCC) [C43.71] Post-op Diagnosis     * Melanoma of foot, right (CMS/HCC) [C43.71]     Procedures  1) Wide excision Right heel melanoma with 2cm margins  2) Right inguinal SLNB with IOLM  3) Right popliteal SLNB with IOLM      Surgeons   Juve Millan MD    Resident/Fellow/Other Assistant:  Surgeon(s) and Role: Kenia Brown MD, PGY1    Procedure Summary  Anesthesia: General  ASA: III  Anesthesia Staff:   Anesthesiologist: Bob Angulo MD; Pablito Joe DO; Vincent Whittaker MD PhD  Anesthesia Resident: Vidal Gaona MD; Darrell Kitchen MD; Mickey Pérez MD  Estimated Blood Loss: 10mL  Intra-op Medications: * No intraprocedure medications in log *        Specimen: sentinel nodes right inguinal x2, popliteal sentinel node x1, heel melanoma    Staff:   Circulator: Lacey Hardwick RN; Joanne Canales RN  Relief Circulator: Namita Gifford RN  Relief Scrub: Lacy Bird RN; Nata Mckenna RN  Scrub Person: Keesha Alexander         Drains and/or Catheters:   [REMOVED] Urethral Catheter Non-latex 16 Fr. (Removed)       Findings: fungating heel melanoma    Indications: Ciara Montes De Oca is an 68 y.o. female who is having surgery for Melanoma of foot, right (CMS/HCC) [C43.71]. SPECT-CT showed sentinel nodes in popliteal, inguinal and iliac nodes. I recommended sentinel node biopsy for popliteal and superficial inguinal nodes as well as wide excision heel.    The patient was seen in the preoperative area. The risks, benefits, complications, treatment options, non-operative alternatives, expected recovery and outcomes were discussed with the patient. The possibilities of reaction to medication, pulmonary aspiration, injury to surrounding structures, bleeding, recurrent infection, the need  for additional procedures, failure to diagnose a condition, and creating a complication requiring transfusion or operation were discussed with the patient. The patient concurred with the proposed plan, giving informed consent.  The site of surgery was properly noted/marked if necessary per policy. The patient has been actively warmed in preoperative area. Preoperative antibiotics have been ordered and given within 1 hours of incision. Venous thrombosis prophylaxis have been ordered including unilateral sequential compression device and chemical prophylaxis    Procedure Details: The patient was brought to the operating room and placed supine on the table. General anesthesia was smoothly induced. Right groin was prepped and draped in the usual fashion. Right inguinal incision was made, dissection was carried into the lymphatic basin. Using the probe, 2 sentinel lymph nodes were identified and excised. The wound was irrigated, hemostasis was assured, local anesthesia was infiltrated, and the wound was closed in layers. 3-0 Vicryl was used for the Keya's fascia layer and the deep dermal layer. 4-0 Monocryl was used on the skin. Surgical glue was applied as a dressing.    She was now repositioned prone. Right lower extremity was circumferentially prepped and draped.     Direction was turned to the popliteal fossa. Incision was made, dissection was carried into the lymphatic basin. Using the probe, 1 sentinel lymph node was identified and excised. The wound was irrigated, hemostasis was assured, local anesthesia was infiltrated, and the wound was closed in layers. 3-0 Vicryl was used for the Keya's fascia layer and the deep dermal layer. 4-0 Monocryl was used on the skin. Surgical glue was applied as a dressing.    I now directed my attention to the primary melanoma. 2 cm. margins were marked around the melanoma site. The wound markings were 7 x 9cm. wound markings. Skin incision was made with a knife and the skin and  underlying soft tissue was excised. .    I now turned the operation over to Dr Gutierrez.    Complications:  None; patient tolerated the procedure well.    Disposition: PACU - hemodynamically stable.  Condition: stable       Attending Attestation: I was present and scrubbed for the entire procedure.    Juve Millan  Phone Number: [phone]

## 2023-11-22 NOTE — ANESTHESIA PROCEDURE NOTES
Arterial Line:    Date/Time: 11/22/2023 12:00 PM    Staffing  Performed: resident   Authorized by: Pablito Joe DO    Performed by: Darrell Kitchen MD    An arterial line was placed. Procedure performed using surface landmarks.in the OR for the following indication(s): continuous blood pressure monitoring and blood sampling needed.    A 20 gauge (size), 1 and 3/8 inch (length), Angiocath (type) catheter was placed into the Right radial artery, secured by Tegaderm,   Seldinger technique used.  Events:  patient tolerated procedure well with no complications.      Additional notes:  2nd attempt

## 2023-11-22 NOTE — BRIEF OP NOTE
Date: 2023  OR Location: [facility] OR    Name: Ciara Montes De Oca, : 1955, Age: 68 y.o., MRN: [id number], Sex: female    Diagnosis  Pre-op Diagnosis     * Melanoma of foot, right (CMS/HCC) [C43.71] Post-op Diagnosis     * Melanoma of foot, right (CMS/HCC) [C43.71]     Procedures  RIGHT HEEL MELANOMA WIDE EXCISION,  50385 - TN EXCISION MALIGNANT LESION TRUNK/ARM/LEG > 4.0 CM    [place] Lymph Node Biopsy  16401 - TN OPEN BIOPSY/EXCISION INGUINOFEMORAL NODES    Creation Fasciocutaneous Flap Lower Extremity  31060 - TN MUSC MYOCUTANEOUS/FASCIOCUTANEOUS FLAP LXTR      Surgeons   Panel 1:     * Juve Millan - Primary  Panel 2:     * Jose Gutierrez - Primary    Resident/Fellow/Other Assistant:  Surgeon(s) and Role: Kenia Brown, PGY-1    Procedure Summary  Anesthesia: General  ASA: III  Anesthesia Staff: Anesthesiologist: Bob Angulo MD; Pablito Joe DO  Anesthesia Resident: Vidal Gaona MD; Darrell Kitchen MD; Mickey Pérez MD  Estimated Blood Loss: 10mL  Intra-op Medications:   Medication Name Total Dose   heparin (porcine) injection 5,000 Units 5,000 Units              Anesthesia Record               Intraprocedure I/O Totals          Intake    Ketamine 0.00 mL    The total shown is the total volume documented since Anesthesia Start was filed.    Propofol Drip 0.00 mL    The total shown is the total volume documented since Anesthesia Start was filed.    lactated Ringer's infusion 1000.00 mL    Total Intake 1000 mL       Output    Urine 695 mL    Total Output 695 mL       Net    Net Volume 305 mL          Specimen:   ID Type Source Tests Collected by Time   1 : RIGHT INGUINAL SENTINAL LYMPH NODE Tissue INGUINAL LYMPH NODE BIOPSY RIGHT DERMPATH-SURGICAL PATHOLOGY Juve Millan MD 2023 1247   2 : RIGHT INGUINAL SENTINAL LYMPH NODE Tissue INGUINAL LYMPH NODE BIOPSY RIGHT DERMPATH-SURGICAL PATHOLOGY Juve Millan MD 2023 1255   3 : RIGHT POPLITEAL SENTINAL LYMPH NODE  Tissue LYMPH NODE SOFT TISSUE DERMPATH-SURGICAL PATHOLOGY Juve Millan MD 11/22/2023 1412   4 : RIGHT HEEL MELANOMA Tissue SKIN EXCISION DERMPATH-SURGICAL PATHOLOGY Juve Millan MD 11/22/2023 1456   A :  Blood Blood, Venous ARBOVIRUS IGG, IGM ABS Juve Millan MD 11/22/2023 1212        Staff:   Circulator: Lacey Hardwick RN; Joanne Canales RN  Relief Circulator: Namita Gifford RN  Relief Scrub: Lacy Bird RN; Nata Mckenna RN  Scrub Person: Keesha Alexander      Findings: right inguinal sentinel lymph node excision x2, right popliteal sentinel lymph node x1, right heel melanoma with wide excision    Complications:  None; patient tolerated the procedure well.     Disposition: PACU - hemodynamically stable.  Condition: stable  Specimens Collected:   ID Type Source Tests Collected by Time   1 : RIGHT INGUINAL SENTINAL LYMPH NODE Tissue INGUINAL LYMPH NODE BIOPSY RIGHT DERMPATH-SURGICAL PATHOLOGY Juve Millan MD 11/22/2023 1247   2 : RIGHT INGUINAL SENTINAL LYMPH NODE Tissue INGUINAL LYMPH NODE BIOPSY RIGHT DERMPATH-SURGICAL PATHOLOGY Juve Millan MD 11/22/2023 1255   3 : RIGHT POPLITEAL SENTINAL LYMPH NODE Tissue LYMPH NODE SOFT TISSUE DERMPATH-SURGICAL PATHOLOGY Juve Millan MD 11/22/2023 1412   4 : RIGHT HEEL MELANOMA Tissue SKIN EXCISION DERMPATH-SURGICAL PATHOLOGY Juve Millan MD 11/22/2023 1456   A :  Blood Blood, Venous ARBOVIRUS IGG, IGM ABS Juve Millan MD 11/22/2023 1212     Attending Attestation:     Kenia Brown MD  Surgical Oncology, [place] [phone]

## 2023-11-22 NOTE — ANESTHESIA POSTPROCEDURE EVALUATION
Patient: Ciara Montes De Oca    Procedure Summary       Date: 11/22/23 Room / Location: [facility] OR 22 / [facility]    Anesthesia Start: 0831 Anesthesia Stop: 1702    Procedures:       RIGHT HEEL MELANOMA WIDE EXCISION, (Right)      [place] Lymph Node Biopsy (Right)      Creation Fasciocutaneous Flap Lower Extremity (Right) Diagnosis:       Melanoma of foot, right (CMS/HCC)      (Melanoma of foot, right (CMS/HCC) [C43.71])    Surgeons: Juve Millan MD; Jose Gutierrez MD Responsible Provider: Pablito Joe DO    Anesthesia Type: MAC ASA Status: 3            Anesthesia Type: General    Vitals Value Taken Time   /83 11/22/23 1700   Temp 36.2 °C (97.2 °F) 11/22/23 1655   Pulse 69 11/22/23 1703   Resp 8 11/22/23 1703   SpO2 100 % 11/22/23 1703   Vitals shown include unvalidated device data.    Anesthesia Post Evaluation    Patient location during evaluation: bedside  Patient participation: complete - patient participated  Level of consciousness: awake and alert  Pain management: adequate  Multimodal analgesia pain management approach  Airway patency: patent  Cardiovascular status: hemodynamically stable and acceptable  Respiratory status: acceptable  Hydration status: acceptable  Postoperative Nausea and Vomiting: none        No notable events documented.

## 2023-11-22 NOTE — OP NOTE
RIGHT HEEL MELANOMA WIDE EXCISION, (R), [place] Lymph Node Biopsy (R) Operative Note     Date: 2023  OR Location: [facility] OR    Name: Ciara Montes De Oca, : 1955, Age: 68 y.o., MRN: [id number], Sex: female    Diagnosis  Pre-op Diagnosis     * Melanoma of foot, right (CMS/HCC) [C43.71] Post-op Diagnosis     * Melanoma of foot, right (CMS/HCC) [C43.71]     Procedures  Right Reverse Sural Flap Delay  Placement of INTEGRA  Neurolysis of Sural Nerve    Surgeons   Panel 1:     * Juve Millan - Primary  Panel 2:     * Jose Gutierrez - Primary    Resident/Fellow/Other Assistant:  Surgeon(s) and Role: Juli Taveras    Given the inherent complexity of this surgery, a second surgeon or a surgically skilled physician assistant was necessary for the successful completion of this entire operative procedure. Juli Taveras served as the surgical assistant and was present for the entire operative procedure and participated in all aspects of patient care. This included transporting the patient to the operating room and entering room with the patient, assisting with preoperative positioning, first assisting throughout the entire surgical procedure by functioning as a second assistant surgeon, assisting with surgical closure, and finally accompanying the patient to recovery.      Procedure Summary  Anesthesia: General  ASA: III  Anesthesia Staff: Anesthesiologist: Bob Angulo MD; Pablito Joe DO  Anesthesia Resident: Darrell Kitchen MD; Mickey Pérez MD  Estimated Blood Loss: 20mL  Intra-op Medications:   Medication Name Total Dose   heparin (porcine) injection 5,000 Units 5,000 Units              Anesthesia Record               Intraprocedure I/O Totals          Intake    Ketamine 0.00 mL    The total shown is the total volume documented since Anesthesia Start was filed.    Propofol Drip 0.00 mL    The total shown is the total volume documented since Anesthesia Start was filed.    lactated Ringer's  infusion 1000.00 mL    Total Intake 1000 mL       Output    Urine 695 mL    Total Output 695 mL       Net    Net Volume 305 mL          Specimen:   ID Type Source Tests Collected by Time   1 : RIGHT INGUINAL SENTINAL LYMPH NODE Tissue INGUINAL LYMPH NODE BIOPSY RIGHT DERMPATH-SURGICAL PATHOLOGY Juve Millan MD 11/22/2023 1247   2 : RIGHT INGUINAL SENTINAL LYMPH NODE Tissue INGUINAL LYMPH NODE BIOPSY RIGHT DERMPATH-SURGICAL PATHOLOGY Juve Millan MD 11/22/2023 1255   3 : RIGHT POPLITEAL SENTINAL LYMPH NODE Tissue LYMPH NODE SOFT TISSUE DERMPATH-SURGICAL PATHOLOGY Juve Millan MD 11/22/2023 1412   A :  Blood Blood, Venous ARBOVIRUS IGG, IGM ABS Juve Millan MD 11/22/2023 1212        Staff:   Circulator: Lacey Hardwick RN; Joanne Canales RN  Relief Circulator: Namita Gifford RN  Relief Scrub: Lacy Bird RN  Scrub Person: Keesha Alexander         Drains and/or Catheters:   Urethral Catheter Non-latex 16 Fr. (Active)     INDICATION:     Ciara Montes De Oca is a 68 y.o. female who was referred by Dr. Millna  for evaluation of right plantar heel ulcerated acral melanoma, Breslow 1.3 mm.  Patient has been seen by dermatology, surgical oncology, and recommendation was made for wide local excision with 1 to 2 cm margins and sentinel lymph node biopsy..          Plan:   Patient will be faced with a large lesion on the plantar/weightbearing surface of the heel of the right leg.  I discussed multiple surgical options.  1.  Secondary healing by granulation  2.  Reverse sural flap, delay, followed by secondary skin graft  3. Free tissue transfer.     The patient is very active, and was very adamant against free tissue transfer at this time.     She decided to move forward with the planned sural flap today      INFORMED CONSENT  Patient was told the risks, benefits, INDICATIONS, contraindications, and alternatives to the procedure. Risks include but not limited to pain, infection, bleeding, hematoma, seroma, injury to  neurovascular and tendinous structures, hypoperfusion, vascular congestion of flap, flap loss, need for conversion to free tissue transfer, and need for subsequent surgeries.      The patient demonstrated understanding of these risks and agreed to proceed with surgery.  Advance directives discussed.  Team approach explained.       The patient consented and wished to proceed with the procedure and for medical photography if needed.     PROCEDURAL PAUSE  Prior to the beginning of the procedure, the patient's correct identity, side, site, and procedure to be performed were verified.  The patient was given intravenous antibiotics prior to skin incision.     PROCEDURAL NOTE  Plastic surgery went to the operative theater after Dr. Millan had completed his portion of the procedure, please see his operative notes.    We identified a wound on the calcaneus measuring 6.5 x 7 cm.  We performed a modified Gillies test and created a template of the open wound using laparotomy sponge, transposing the proposed site of the flap in the proximal posterior calf.    The patient was in the prone position. Perforating vessels from the peroneal artery were then identified by Doppler ultrasonography along the posterolateral intermuscular septum of the lower leg. Perforators were found approximately a distance of 5-7cm proximal to the lateral malleolus. A line was drawn from the popliteal fossa to the lateral malleolus to approximate the vascular axis of the flap. A template of the defect was used to position the flap along this axis, such that the distance from the chosen pivot point to the end of the flap was just greater than the distance from the pivot point to the farthest edge of the defect.    The pedicle of the reverse sural flap contained the sural nerve, median superficial sural artery, and lesser saphenous vein.   The flap was designed to retrograde arterial flow through septocutaneous perforators that originate from the peroneal  artery and directly anastomose with the median superficial sural artery.The lesser saphenous vein was included to transport retrograde venous flow from the flap and bypasses the valves of the deep venous system through anastomotic connections with the venae comitantes of the sural nerve.      We made incision laterally around the edges of the skin paddle design, we dissected down deep to the fascia and began to develop the plane above the gastrocnemius muscle, after this we performed neurolysis and dissection of the sural nerve in order to included into the flap to create for sensate reconstruction..    We made a curvilinear incision along the length of the pedicle, we dissected around the pedicle, leaving the pedicle approximately 4 cm wide to capture as much veinous drainage as possible.  Once the flap and pedicle were dissected, Integra bilayer matrix was placed deep to the skin paddle and pedicle to allow for ease of dissection and optimization of donor site at second stage.  This was inset with 4-0 plain gut suture.  The remaining skin over the vascular pedicle was then closed primarily with 4-0 monocryl deep dermal suture, followed by running 4-0 Chromic Gut baseball stitch suture in the skin.  This was performed circumferentially around the paddle as well.  Doppler ultrasonography was then used to confirm patency of all vessels, confirming arterial and venous signals.    We then placed Integra bilayer matrix over the open heel wound measuring 6.5 x 7 cm..    Wound VAC was applied over the heel, Aquacel Ag was placed over all incisions followed by ABD followed by Ace bandage.     The patient tolerated the procedure well and was awakened from anesthesia without any difficulties, she was transferred to the patient in stable condition, all needle counts were correct.     POST OP PLAN:  Ciara will be kept overnight for pain control and monitoring, as well as to set up home care for Monday/Thursday VAC changes over  the heel.  Over the posterior calf she is instructed to place either Xeroform, ABD, Ace, or Aquacel Ag, ABD Ace.    Will plan for second stage surgery, inset of flap with skin graft in 2 to 4 weeks    Jose Gutierrez MD

## 2023-11-22 NOTE — ANESTHESIA PROCEDURE NOTES
Airway  Date/Time: 11/22/2023 11:50 AM  Urgency: elective      Staffing  Performed: resident   Authorized by: Pablito Joe DO    Performed by: Darrell Kitchen MD  Patient location during procedure: OR    Indications and Patient Condition  Indications for airway management: anesthesia  Spontaneous ventilation: present  Sedation level: deep  Preoxygenated: yes  Patient position: sniffing  MILS maintained throughout  Mask difficulty assessment: 2 - vent by mask + OA or adjuvant +/- NMBA    Final Airway Details  Final airway type: endotracheal airway      Successful airway: ETT  Cuffed: yes   Successful intubation technique: direct laryngoscopy  Blade: Marla  Blade size: #4  ETT size (mm): 7.5  Cormack-Lehane Classification: grade IIa - partial view of glottis  Placement verified by: chest auscultation and capnometry   Inital cuff pressure (cm H2O): 8  Measured from: lips  ETT to lips (cm): 21  Number of attempts at approach: 1    Additional Comments  Done by Mickey Pérez

## 2023-11-22 NOTE — ANESTHESIA PROCEDURE NOTES
Peripheral IV  Date/Time: 11/22/2023 12:00 PM  Inserted by: Darrell Kitchen MD    Placement  Needle size: 18 G  Laterality: right  Location: arm  Local anesthetic: none  Site prep: chlorhexidine  Technique: anatomical landmarks  Attempts: 2

## 2023-11-22 NOTE — ANESTHESIA PREPROCEDURE EVALUATION
Patient: Ciara Montes De Oca    Procedure Information       Date/Time: 11/22/23 1000    Procedures:       RIGHT HEEL MELANOMA WIDE EXCISION, (Right)      [place] Lymph Node Biopsy (Right)      Creation Fasciocutaneous Flap Lower Extremity (Right)    Location: [facility] OR 22 / [facility] OR    Surgeons: Juve Millan MD; Jose Gutierrez MD            Relevant Problems   Anesthesia (within normal limits)      Cardiovascular   (+) Primary hypertension      Pulmonary (within normal limits)      Musculoskeletal  Melanoma of heel     Hx of breast CA s/p L lumpectomy with LN dissection   Clinical information reviewed:                   NPO Detail:  No data recorded     Physical Exam    Airway  Mallampati: II  TM distance: >3 FB  Neck ROM: full     Cardiovascular - normal exam  Rhythm: regular     Dental   (+) upper dentures     Pulmonary - normal exam     Abdominal - normal exam             Anesthesia Plan    ASA 3     MAC     The patient is not a current smoker.  Patient was previously instructed to abstain from smoking on day of procedure.  Patient did not smoke on day of procedure.  Education provided regarding risk of obstructive sleep apnea.  intravenous induction   Postoperative administration of opioids is intended.  Anesthetic plan and risks discussed with patient.  Use of blood products discussed with patient who.    Plan discussed with resident.

## 2023-11-27 ENCOUNTER — PREP FOR PROCEDURE (OUTPATIENT)
Dept: PLASTIC SURGERY | Facility: CLINIC | Age: 68
End: 2023-11-27
Payer: COMMERCIAL

## 2023-11-27 ENCOUNTER — TELEPHONE (OUTPATIENT)
Dept: PLASTIC SURGERY | Facility: CLINIC | Age: 68
End: 2023-11-27

## 2023-11-27 DIAGNOSIS — C43.71: ICD-10-CM

## 2023-11-27 DIAGNOSIS — S91.301A NON-HEALING OPEN WOUND OF HEEL, RIGHT, INITIAL ENCOUNTER: Primary | ICD-10-CM

## 2023-11-27 DIAGNOSIS — S91.301D OPEN WOUND OF RIGHT HEEL, SUBSEQUENT ENCOUNTER: Primary | ICD-10-CM

## 2023-11-27 PROBLEM — S91.309A NON-HEALING OPEN WOUND OF HEEL: Status: ACTIVE | Noted: 2023-11-27

## 2023-11-30 ENCOUNTER — OFFICE VISIT (OUTPATIENT)
Dept: PLASTIC SURGERY | Facility: CLINIC | Age: 68
End: 2023-11-30
Payer: COMMERCIAL

## 2023-11-30 ENCOUNTER — DOCUMENTATION (OUTPATIENT)
Dept: PLASTIC SURGERY | Facility: CLINIC | Age: 68
End: 2023-11-30

## 2023-11-30 VITALS
SYSTOLIC BLOOD PRESSURE: 127 MMHG | HEIGHT: 61 IN | WEIGHT: 138 LBS | DIASTOLIC BLOOD PRESSURE: 76 MMHG | BODY MASS INDEX: 26.06 KG/M2 | HEART RATE: 69 BPM

## 2023-11-30 DIAGNOSIS — G89.18 POST-OP PAIN: Primary | ICD-10-CM

## 2023-11-30 PROCEDURE — 3074F SYST BP LT 130 MM HG: CPT | Performed by: PHYSICIAN ASSISTANT

## 2023-11-30 PROCEDURE — 1126F AMNT PAIN NOTED NONE PRSNT: CPT | Performed by: PHYSICIAN ASSISTANT

## 2023-11-30 PROCEDURE — 3078F DIAST BP <80 MM HG: CPT | Performed by: PHYSICIAN ASSISTANT

## 2023-11-30 PROCEDURE — 99024 POSTOP FOLLOW-UP VISIT: CPT | Performed by: PHYSICIAN ASSISTANT

## 2023-11-30 PROCEDURE — 1036F TOBACCO NON-USER: CPT | Performed by: PHYSICIAN ASSISTANT

## 2023-11-30 PROCEDURE — 1159F MED LIST DOCD IN RCRD: CPT | Performed by: PHYSICIAN ASSISTANT

## 2023-12-04 LAB
LAB AP ASR DISCLAIMER: NORMAL
LABORATORY COMMENT REPORT: NORMAL
PATH REPORT.FINAL DX SPEC: NORMAL
PATH REPORT.GROSS SPEC: NORMAL
PATH REPORT.TOTAL CANCER: NORMAL
PATHOLOGY SYNOPTIC REPORT: NORMAL

## 2023-12-04 RX ORDER — GABAPENTIN 100 MG/1
100 CAPSULE ORAL 3 TIMES DAILY
Qty: 90 CAPSULE | Refills: 5 | Status: ON HOLD | OUTPATIENT
Start: 2023-12-04 | End: 2024-01-09 | Stop reason: ALTCHOICE

## 2023-12-04 NOTE — H&P (VIEW-ONLY)
Department of Plastic and Reconstructive Surgery            Post Operative Visit    Date: 11/20/23  Date of Surgery: 11/22/23    Subjective   Ciara Montes De Oca is a 68 y.o. female who presents for POV. They are s/p right heel melanoma wide excision with SLNB with Dr. Millan and  right reverse sural flap delay with placement of integra on 11/22/23 with Dr. Gutierrez.       She presented for POV. She was having difficulty with home care however she was able to receive home care for vac changes. She endorses significant pain of the lower leg and behind her knee.       Objective   Vital Signs:   Vitals:    11/30/23 1026   BP: 127/76   Pulse: 69     Gen: interactive and pleasant  Head: NCAT  Eyes: EOMI, PERRLA  Mouth: MMM  Throat: trachea midline  Cor: RRR  Pulm: nonlabored breathing  Abd: s/nt/nd  Neuro: AAOx3  Ext: extremities perfused    Focused exam of the: RLE    Wound vac removed without difficulty. Integra is in place over the heal. Wound edges are viable. No evidence of necrosis. Posterior right calf with sural flap that is well perfused. There is mild edema of the calf. There is significant pain to light touch over the posterior leg and behind the knee where the SLNB was performed. There is no erythema or concerns for infection. No evidence of ischemia or delayed wound healing.      Assessment/Plan     Ciara Montes De Oca is a 68 y.o. female who presents for POV. They are s/p right heel melanoma wide excision with SLNB with Dr. Millan and  right reverse sural flap delay with placement of integra on 11/22/23 with Dr. Gutierrez.       She presented for POV. She was instructed to continue with wound vac changes. Office to arrange dated for sural flap and skin graft. We will prescribed Gabapentin 100 TID for nerve related pain.       Plan:   -gabapentin  -plan for delayed flap and skin graft     I spent 20 minutes with this patient. Greater than 50% of this time was spent in the counselling and/or  coordination of care of this patient.  This note was created using voice recognition software and was not corrected for typographical or grammatical errors.    Signature: Danita Gonzalez PA-C  Date: 11/30/23

## 2023-12-05 ENCOUNTER — PREP FOR PROCEDURE (OUTPATIENT)
Dept: PLASTIC SURGERY | Facility: CLINIC | Age: 68
End: 2023-12-05
Payer: COMMERCIAL

## 2023-12-06 NOTE — PROGRESS NOTES
Subjective     HPI  Ciara Montes De Oca is a 68 y.o. female here in postoperative followup after undergoing 1) Wide excision Right heel melanoma with 2cm margins 2) Right inguinal SLNB 3) Right popliteal SLNB and Right Reverse Sural Flap Delay, Placement of INTEGRA, Neurolysis of Sural Nerve on 11/22/2023 with me and Dr Gutierrez. She's doing well overall. Having some pain and neuropathic pain. Ankle swelling    Objective     Vitals:    12/07/23 1136   BP: 132/62   Pulse: 70   Resp: 16   Temp: 36.5 °C (97.7 °F)   SpO2: 99%         NAD  Groin and popliteal incisions healing well without drainage or infection    PATHOLOGY  FINAL DIAGNOSIS   A. RIGHT INGUINAL SENTINEL LYMPH NODE:   --ONE LYMPH NODE WITH A SOLITARY ATYPICAL MELANOCYTE, SEE COMMENT     COMMENT: Routine and immunostained sections show a solitary atypical melanocyte with multinucleation, nucleomegaly and pigmented cytoplasm abutting a capsular blood vessel positive for Melan-A, SOX10 and HMB45.  Given morphologic similarity to the patient's invasive melanoma (Part D), this isolated finding is interpreted as picometastatic melanoma  (< 0.1 mm) with no extracapsular extension.     B. RIGHT INGUINAL SENTINEL LYMPH NODE BIOPSY #2:   --ONE LYMPH NODE WITH A SOLITARY ATYPICAL MELANOCYTE, SEE COMMENT     COMMENT: Routine and immunostained sections show a solitary atypical subcapsular cell with nucleomegaly and increased cytoplasm positive for Melan-A, SOX10 and HMB45.  Given morphologic similarity to the patient's invasive melanoma (Part D), this isolated finding is interpreted as picometastatic melanoma  (< 0.1 mm) with no extracapsular extension.     C. RIGHT POPLITEAL SENTINEL LYMPH NODE:   --ONE LYMPH NODE, NEGATIVE FOR METASTATIC MELANOMA     COMMENT: Routine and immunostained sections including Melan-A, SOX10 and HMB45 were reviewed.     D. SKIN, RIGHT HEEL, EXCISION:   --MELANOMA INVASIVE, ESTIMATED BRESLOW'S DEPTH 7.6 MM, ULCERATED, NOT PRESENT AT SURGICAL  MARGINS, SEE COMMENT AND SYNOPTIC REPORT     COMMENT: Sections show residual invasive and in-situ acral melanoma with broad surface ulceration and an estimated Breslow's depth of 7.6 mm.  The invasive component is multinodular and has mixed epithelioid and spindle morphology.  Lymphovascular and perineural invasion are seen.     :  Dr. K Behrens   Electronically signed by Sandi Yancey MD on 12/4/2023 at 1226        By the signature on this report, the individual or group listed as making the Final Interpretation/Diagnosis certifies that they have reviewed this case.    Case Summary Report   MELANOMA OF THE SKIN: Excision, Re-Excision   8th Edition - Protocol posted: 11/10/2021MELANOMA OF THE SKIN: EXCISION, RE-EXCISION  - All Specimens  SPECIMEN   Procedure  Excision     [place] node(s) biopsy   Specimen Laterality  Right   TUMOR   Tumor Site  Skin of lower limb and hip: Heel        Histologic Type  Acral melanoma   Maximum Tumor (Breslow) Thickness (Millimeters)  7.6 mm   Macroscopic Satellite Nodule(s)  Not identified   Ulceration  Present   Extent of Ulceration (Millimeters)  13 mm   Anatomic ([place]) Level  V (Melanoma invades subcutis)   Mitotic Rate  6 mitoses per mm2   Microsatellite(s)  Not identified   Lymphovascular Invasion  Present   Neurotropism  Present   Tumor-Infiltrating Lymphocytes  Present, nonbrisk   Tumor Regression  Not identified   MARGINS     Margin Status for Invasive Melanoma  All margins negative for invasive melanoma   Closest Margin Location(s) to Invasive Melanoma  9-12-3:00 margin   Distance from Invasive Melanoma to Peripheral Margin  At least: 16 mm   Distance from Invasive Melanoma to Deep Margin  At least: 9 mm   Margin Status for Melanoma in situ  All margins negative for melanoma in situ   Distance from Melanoma in Situ to Peripheral Margin  At least: 10 mm   REGIONAL LYMPH NODES     Regional Lymph Node Status  Tumor present in regional lymph node(s)   Total  Number of Lymph Nodes with Tumor  2   Number of [place] Lymph Nodes with Tumor  2   [place] Site(s) with Tumor  Capsular and subcapsular   Size of Largest [place] Node Metastatic Deposit  Less than: 0.1 mm   Extranodal Extension  Not identified   Matted Nodes  Not identified   Total Number of Lymph Nodes Examined  3   Number of [place] Nodes Examined  3   PATHOLOGIC STAGE CLASSIFICATION (pTNM, AJCC 8th Edition)  Classification assigned in this report includes information from a prior procedure: [id number]   pT Category  pT4b   pN Category  pN2a   .          Assessment/Plan   68-year-old woman with stage IIIc (T4b N0) melanoma.  I discussed pathology with her.  She will be undergoing reconstructive surgery later this month.  We discussed adjuvant immunotherapy and she will be seeing an oncologist for this.  I will also order groin lymph node ultrasounds every 6 months in addition to the staging studies that her oncologist will order.    Juve Millan MD

## 2023-12-07 ENCOUNTER — OFFICE VISIT (OUTPATIENT)
Dept: SURGICAL ONCOLOGY | Facility: CLINIC | Age: 68
End: 2023-12-07
Payer: COMMERCIAL

## 2023-12-07 VITALS
BODY MASS INDEX: 26.16 KG/M2 | WEIGHT: 138.45 LBS | RESPIRATION RATE: 16 BRPM | HEART RATE: 70 BPM | SYSTOLIC BLOOD PRESSURE: 132 MMHG | OXYGEN SATURATION: 99 % | TEMPERATURE: 97.7 F | DIASTOLIC BLOOD PRESSURE: 62 MMHG

## 2023-12-07 DIAGNOSIS — C43.71: ICD-10-CM

## 2023-12-07 PROCEDURE — 1036F TOBACCO NON-USER: CPT | Performed by: SURGERY

## 2023-12-07 PROCEDURE — 1159F MED LIST DOCD IN RCRD: CPT | Performed by: SURGERY

## 2023-12-07 PROCEDURE — 3078F DIAST BP <80 MM HG: CPT | Performed by: SURGERY

## 2023-12-07 PROCEDURE — 3075F SYST BP GE 130 - 139MM HG: CPT | Performed by: SURGERY

## 2023-12-07 PROCEDURE — 1126F AMNT PAIN NOTED NONE PRSNT: CPT | Performed by: SURGERY

## 2023-12-07 PROCEDURE — 99024 POSTOP FOLLOW-UP VISIT: CPT | Performed by: SURGERY

## 2023-12-07 ASSESSMENT — PAIN SCALES - GENERAL: PAINLEVEL: 0-NO PAIN

## 2023-12-10 DIAGNOSIS — C43.9 MELANOMA OF SKIN (MULTI): Primary | ICD-10-CM

## 2023-12-10 NOTE — TUMOR BOARD NOTE
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  S/p WLE with 2 cm margins showing an ulcerated invasive melanoma, estimated Breslow's depth 7.6 mm, not present at surgical margins, lymphovascular and perineural invasion seen, and SLNB showing lymph nodes with solitary atypical melanocytes, picometastatic melanoma  (< 0.1 mm) with no extracapsular extension (2/3).    Recommendation:  Imaging to rule out metastatic disease. Consider complete lymph node dissection vs. close follow-up with serial ultrasounds. Refer to medical oncology.  Consider REGE 2623.    Review Multidisciplinary Cutaneous Oncology Conference recommendation with patient.  Continue routine follow up and total body skin exams with Dermatology.    Follow Up:  Oh Arciniega (Podiatry), Juve Garcia, Medical Oncology, Dermatology      History and Physical Exam  Dermatologic History:   68 y.o. female with a biopsy of the right plantar heel on 10- showing an ulcerated melanoma, acral type, Breslow: at least 1.3 mm, broadly transected on the deep margin.  S/p on 11- WLE with 2 cm margins showing an ulcerated invasive melanoma, estimated Breslow's depth 7.6 mm, not present at surgical margins, lymphovascular and perineural invasion are seen, right inguinal SLNB showing lymph nodes with solitary atypical melanocytes, picometastatic melanoma  (< 0.1 mm) with no extracapsular extension (2/2), and benign right popliteal SLNB (0/1).    Past Medical History:  Breast cancer in 2010    Family History  of DNS/MM:  Unknown    Skin:  There is a large darkly discolored lesion encompassing the plantar aspect of the heel with a fungating bleeding mass.    Lymphatic:  No palpable lymph nodes in the right inguinal or popliteal regions.      Pathology  Dermatopathology- LAB: [id number]  Collected 11/22/2023 12:47    A. RIGHT INGUINAL SENTINEL LYMPH NODE:   --ONE LYMPH NODE WITH A SOLITARY ATYPICAL MELANOCYTE, SEE COMMENT     COMMENT: Routine and immunostained sections show a solitary atypical melanocyte with multinucleation, nucleomegaly and pigmented cytoplasm abutting a capsular blood vessel positive for Melan-A, SOX10 and HMB45.  Given morphologic similarity to the patient's invasive melanoma (Part D), this isolated finding is interpreted as picometastatic melanoma  (< 0.1 mm) with no extracapsular extension.     B. RIGHT INGUINAL SENTINEL LYMPH NODE BIOPSY #2:   --ONE LYMPH NODE WITH A SOLITARY ATYPICAL MELANOCYTE, SEE COMMENT     COMMENT: Routine and immunostained sections show a solitary atypical subcapsular cell with nucleomegaly and increased cytoplasm positive for Melan-A, SOX10 and HMB45.  Given morphologic similarity to the patient's invasive melanoma (Part D), this isolated finding is interpreted as picometastatic melanoma  (< 0.1 mm) with no extracapsular extension.     C. RIGHT POPLITEAL SENTINEL LYMPH NODE:   --ONE LYMPH NODE, NEGATIVE FOR METASTATIC MELANOMA     COMMENT: Routine and immunostained sections including Melan-A, SOX10 and HMB45 were reviewed.     D. SKIN, RIGHT HEEL, EXCISION:   --MELANOMA INVASIVE, ESTIMATED BRESLOW'S DEPTH 7.6 MM, ULCERATED, NOT PRESENT AT SURGICAL MARGINS, SEE COMMENT AND SYNOPTIC REPORT     COMMENT: Sections show residual invasive and in-situ acral melanoma with broad surface ulceration and an estimated Breslow's depth of 7.6 mm.  The invasive component is multinodular and has mixed epithelioid and spindle morphology.  Lymphovascular and perineural invasion are seen.      :  Dr. K Behrens    Procedure  Excision     [place] node(s) biopsy   Specimen Laterality  Right     Tumor Site  Skin of lower limb and hip: Heel        Histologic Type  Acral melanoma   Maximum Tumor (Breslow) Thickness (Millimeters)  7.6 mm   Macroscopic Satellite Nodule(s)  Not identified   Ulceration  Present   Extent of Ulceration (Millimeters)  13 mm   Anatomic (Booker) Level  V (Melanoma invades subcutis)   Mitotic Rate  6 mitoses per mm2   Microsatellite(s)  Not identified   Lymphovascular Invasion  Present   Neurotropism  Present   Tumor-Infiltrating Lymphocytes  Present, nonbrisk   Tumor Regression  Not identified   MARGINS     Margin Status for Invasive Melanoma  All margins negative for invasive melanoma   Closest Margin Location(s) to Invasive Melanoma  9-12-3:00 margin   Distance from Invasive Melanoma to Peripheral Margin  At least: 16 mm   Distance from Invasive Melanoma to Deep Margin  At least: 9 mm   Margin Status for Melanoma in situ  All margins negative for melanoma in situ   Distance from Melanoma in Situ to Peripheral Margin  At least: 10 mm   REGIONAL LYMPH NODES     Regional Lymph Node Status  Tumor present in regional lymph node(s)   Total Number of Lymph Nodes with Tumor  2   Number of [place] Lymph Nodes with Tumor  2   Ian Site(s) with Tumor  Capsular and subcapsular   Size of Largest [place] Node Metastatic Deposit  Less than: 0.1 mm   Extranodal Extension  Not identified   Matted Nodes  Not identified   Total Number of Lymph Nodes Examined  3   Number of [place] Nodes Examined  3   PATHOLOGIC STAGE CLASSIFICATION (pTNM, AJCC 8th Edition)  Classification assigned in this report includes information from a prior procedure: [id number]   pT Category  pT4b   pN Category  pN2a     ____________________________________________________________________________________________________________________________________    Derm Consult: [id number]  4 SLIDES, [facility] PATHOLOGY  [facility], [id number], 10/16/23     SKIN AND SUBCUTANEOUS TISSUE, RIGHT PLANTAR HEEL, BIOPSY:  ULCER AND MALIGNANT MELANOMA, BRESLOW THICKNESS AT LEAST 1.3 MM, PRESENT ON THE DEEP AND PERIPHERAL MARGIN, SEE NOTE.     Note: Microscopic examination reveals a specimen that extends into the dermis. There is an ulcer and there are nests of atypical epithelioid cells with melanin pigmentation. These cells stain strongly with antibodies against SOX-10 and Melan-A, and the pigment stains with a [place] Glenna stain.     ** Electronically signed out by Gaudencio Woods MD **     MELANOMA OF THE SKIN: Biopsy   8th Edition - Protocol posted: 3/23/2022MELANOMA OF THE SKIN: BIOPSY - All Specimens  SPECIMEN   Procedure  Not specified   Specimen Laterality  Right   TUMOR   Tumor Site  Skin of lower limb and hip: Right plantar heel        Histologic Type  Acral melanoma   Maximum Tumor (Breslow) Thickness (Millimeters)  At least: 1.3 mm     Broadly transected on the deep margin.   Ulceration  Present   Extent of Ulceration (Millimeters)  4.5 mm   Anatomic (Booker) Level  At least level: IV     Broadly transected on the deep margin.   Mitotic Rate  2 mitoses per mm2   Microsatellite(s)  Not identified   Lymphovascular Invasion  Not identified   Neurotropism  Not identified   Tumor-Infiltrating Lymphocytes  Present, nonbrisk   Tumor Regression  Not identified   MARGINS     Margin Status for Invasive Melanoma  Invasive melanoma present at margin   Margin(s) Involved by Invasive Melanoma  Peripheral     Deep   Margin Status for Melanoma in situ  All margins negative for melanoma in situ   PATHOLOGIC STAGE CLASSIFICATION (pTNM, AJCC 8th Edition)     pT Category  pT2b

## 2023-12-11 ENCOUNTER — TUMOR BOARD CONFERENCE (OUTPATIENT)
Dept: HEMATOLOGY/ONCOLOGY | Facility: HOSPITAL | Age: 68
End: 2023-12-11
Payer: COMMERCIAL

## 2023-12-12 ENCOUNTER — TELEPHONE (OUTPATIENT)
Dept: HEMATOLOGY/ONCOLOGY | Facility: HOSPITAL | Age: 68
End: 2023-12-12
Payer: COMMERCIAL

## 2023-12-12 NOTE — TELEPHONE ENCOUNTER
Patient returned my call, spoke to patient, patient agreeable to seeing Dr. Keys at the [place] location, patient would like to be seen 3-4 weeks after her surgery with plastics on 12/21, patient scheduled for NPV with Dr. Keys on 01/18/2024 at 12:00 pm, reviewed address on [facility] and discussed appointment information being seen in [facility].

## 2023-12-12 NOTE — TELEPHONE ENCOUNTER
Attempted to call patient to discuss scheduling her NPV with Dr. Keys for this Thursday at [facility] location, no answer, voice message left to return the call to our office at [phone]

## 2023-12-13 DIAGNOSIS — C43.71: Primary | ICD-10-CM

## 2023-12-21 ENCOUNTER — ANESTHESIA (OUTPATIENT)
Dept: OPERATING ROOM | Facility: HOSPITAL | Age: 68
End: 2023-12-21
Payer: COMMERCIAL

## 2023-12-21 ENCOUNTER — ANESTHESIA EVENT (OUTPATIENT)
Dept: OPERATING ROOM | Facility: HOSPITAL | Age: 68
End: 2023-12-21
Payer: COMMERCIAL

## 2023-12-21 ENCOUNTER — HOSPITAL ENCOUNTER (OUTPATIENT)
Facility: HOSPITAL | Age: 68
Setting detail: OUTPATIENT SURGERY
Discharge: HOME | End: 2023-12-21
Attending: SURGERY | Admitting: SURGERY
Payer: COMMERCIAL

## 2023-12-21 ENCOUNTER — APPOINTMENT (OUTPATIENT)
Dept: CARDIOLOGY | Facility: HOSPITAL | Age: 68
End: 2023-12-21
Payer: COMMERCIAL

## 2023-12-21 VITALS
WEIGHT: 135 LBS | TEMPERATURE: 96.8 F | RESPIRATION RATE: 16 BRPM | SYSTOLIC BLOOD PRESSURE: 111 MMHG | BODY MASS INDEX: 25.51 KG/M2 | DIASTOLIC BLOOD PRESSURE: 66 MMHG | HEART RATE: 68 BPM | OXYGEN SATURATION: 97 %

## 2023-12-21 DIAGNOSIS — G89.18 POST-OP PAIN: ICD-10-CM

## 2023-12-21 DIAGNOSIS — C43.71: ICD-10-CM

## 2023-12-21 DIAGNOSIS — S91.301A NON-HEALING OPEN WOUND OF HEEL, RIGHT, INITIAL ENCOUNTER: Primary | ICD-10-CM

## 2023-12-21 LAB
ANION GAP SERPL CALC-SCNC: 13 MMOL/L (ref 10–20)
BUN SERPL-MCNC: 14 MG/DL (ref 6–23)
CALCIUM SERPL-MCNC: 9.9 MG/DL (ref 8.6–10.3)
CHLORIDE SERPL-SCNC: 104 MMOL/L (ref 98–107)
CO2 SERPL-SCNC: 26 MMOL/L (ref 21–32)
CREAT SERPL-MCNC: 0.86 MG/DL (ref 0.5–1.05)
GFR SERPL CREATININE-BSD FRML MDRD: 74 ML/MIN/1.73M*2
GLUCOSE SERPL-MCNC: 115 MG/DL (ref 74–99)
HCT VFR BLD AUTO: 39.6 % (ref 36–46)
HGB BLD-MCNC: 13.1 G/DL (ref 12–16)
POTASSIUM SERPL-SCNC: 3.7 MMOL/L (ref 3.5–5.3)
SODIUM SERPL-SCNC: 139 MMOL/L (ref 136–145)

## 2023-12-21 PROCEDURE — 7100000010 HC PHASE TWO TIME - EACH INCREMENTAL 1 MINUTE: Performed by: SURGERY

## 2023-12-21 PROCEDURE — 93005 ELECTROCARDIOGRAM TRACING: CPT | Mod: 59

## 2023-12-21 PROCEDURE — C1889 IMPLANT/INSERT DEVICE, NOC: HCPCS | Performed by: SURGERY

## 2023-12-21 PROCEDURE — 3600000007 HC OR TIME - EACH INCREMENTAL 1 MINUTE - PROCEDURE LEVEL TWO: Performed by: SURGERY

## 2023-12-21 PROCEDURE — 36415 COLL VENOUS BLD VENIPUNCTURE: CPT | Performed by: SURGERY

## 2023-12-21 PROCEDURE — 7100000009 HC PHASE TWO TIME - INITIAL BASE CHARGE: Performed by: SURGERY

## 2023-12-21 PROCEDURE — A15100 PR SPLIT GRFT TRUNK,ARM,LEG <100 SQCM

## 2023-12-21 PROCEDURE — 3600000002 HC OR TIME - INITIAL BASE CHARGE - PROCEDURE LEVEL TWO: Performed by: SURGERY

## 2023-12-21 PROCEDURE — 15271 SKIN SUB GRAFT TRNK/ARM/LEG: CPT | Performed by: SURGERY

## 2023-12-21 PROCEDURE — 85014 HEMATOCRIT: CPT | Performed by: SURGERY

## 2023-12-21 PROCEDURE — 93010 ELECTROCARDIOGRAM REPORT: CPT | Performed by: STUDENT IN AN ORGANIZED HEALTH CARE EDUCATION/TRAINING PROGRAM

## 2023-12-21 PROCEDURE — 7100000002 HC RECOVERY ROOM TIME - EACH INCREMENTAL 1 MINUTE: Performed by: SURGERY

## 2023-12-21 PROCEDURE — 15101 SPLT AGRFT T/A/L EA ADDL 100: CPT | Performed by: SURGERY

## 2023-12-21 PROCEDURE — 15002 WOUND PREP TRK/ARM/LEG: CPT | Performed by: SURGERY

## 2023-12-21 PROCEDURE — 82374 ASSAY BLOOD CARBON DIOXIDE: CPT | Performed by: SURGERY

## 2023-12-21 PROCEDURE — A15100 PR SPLIT GRFT TRUNK,ARM,LEG <100 SQCM: Performed by: ANESTHESIOLOGY

## 2023-12-21 PROCEDURE — 15610 DELAY FLAP ARMS/LEGS: CPT | Performed by: SURGERY

## 2023-12-21 PROCEDURE — 2500000004 HC RX 250 GENERAL PHARMACY W/ HCPCS (ALT 636 FOR OP/ED)

## 2023-12-21 PROCEDURE — 2500000005 HC RX 250 GENERAL PHARMACY W/O HCPCS

## 2023-12-21 PROCEDURE — 2780000003 HC OR 278 NO HCPCS: Performed by: SURGERY

## 2023-12-21 PROCEDURE — 3700000001 HC GENERAL ANESTHESIA TIME - INITIAL BASE CHARGE: Performed by: SURGERY

## 2023-12-21 PROCEDURE — 15100 SPLT AGRFT T/A/L 1ST 100SQCM: CPT | Performed by: SURGERY

## 2023-12-21 PROCEDURE — 15003 WOUND PREP ADDL 100 CM: CPT | Performed by: SURGERY

## 2023-12-21 PROCEDURE — 7100000001 HC RECOVERY ROOM TIME - INITIAL BASE CHARGE: Performed by: SURGERY

## 2023-12-21 PROCEDURE — 2500000004 HC RX 250 GENERAL PHARMACY W/ HCPCS (ALT 636 FOR OP/ED): Performed by: ANESTHESIOLOGY

## 2023-12-21 PROCEDURE — 2720000007 HC OR 272 NO HCPCS: Performed by: SURGERY

## 2023-12-21 PROCEDURE — 2500000005 HC RX 250 GENERAL PHARMACY W/O HCPCS: Performed by: SURGERY

## 2023-12-21 PROCEDURE — 15738 MUSCLE-SKIN GRAFT LEG: CPT | Performed by: SURGERY

## 2023-12-21 PROCEDURE — 3700000002 HC GENERAL ANESTHESIA TIME - EACH INCREMENTAL 1 MINUTE: Performed by: SURGERY

## 2023-12-21 PROCEDURE — 15738 MUSCLE-SKIN GRAFT LEG: CPT | Performed by: PHYSICIAN ASSISTANT

## 2023-12-21 DEVICE — IMPLANTABLE DEVICE: Type: IMPLANTABLE DEVICE | Site: LEG | Status: FUNCTIONAL

## 2023-12-21 DEVICE — INTEGRA WOUND MATRIX AND INTEGRA WOUND MATRIX (THIN) ARE COLLAGEN-GLYCOSAMINOGLYCAN WOUND DRESSINGS THAT MAINTAIN AND SUPPORT A HEALING ENVIRONMENT FOR WOUND MANAGEMENT. INTEGRA WOUND MATRIX (THIN) HAS 50% LESS COLLAGEN COMPARED TO EACH OF THE CORRESPONDING SQ CM SIZES OF INTEGRA WOUND MATRIX.
Type: IMPLANTABLE DEVICE | Site: LEG | Status: FUNCTIONAL
Brand: INTEGRA WOUND MATRIX (THIN)

## 2023-12-21 RX ORDER — PROPOFOL 10 MG/ML
INJECTION, EMULSION INTRAVENOUS AS NEEDED
Status: DISCONTINUED | OUTPATIENT
Start: 2023-12-21 | End: 2023-12-21

## 2023-12-21 RX ORDER — CEFAZOLIN SODIUM 2 G/100ML
INJECTION, SOLUTION INTRAVENOUS
Status: DISCONTINUED
Start: 2023-12-21 | End: 2023-12-21 | Stop reason: WASHOUT

## 2023-12-21 RX ORDER — CEFAZOLIN SODIUM 1 G/50ML
SOLUTION INTRAVENOUS AS NEEDED
Status: DISCONTINUED | OUTPATIENT
Start: 2023-12-21 | End: 2023-12-21

## 2023-12-21 RX ORDER — ONDANSETRON HYDROCHLORIDE 2 MG/ML
INJECTION, SOLUTION INTRAVENOUS AS NEEDED
Status: DISCONTINUED | OUTPATIENT
Start: 2023-12-21 | End: 2023-12-21

## 2023-12-21 RX ORDER — ONDANSETRON 4 MG/1
4 TABLET, ORALLY DISINTEGRATING ORAL EVERY 8 HOURS PRN
Qty: 6 TABLET | Refills: 0 | Status: ON HOLD | OUTPATIENT
Start: 2023-12-21 | End: 2024-01-09 | Stop reason: ALTCHOICE

## 2023-12-21 RX ORDER — SODIUM CHLORIDE, SODIUM LACTATE, POTASSIUM CHLORIDE, CALCIUM CHLORIDE 600; 310; 30; 20 MG/100ML; MG/100ML; MG/100ML; MG/100ML
100 INJECTION, SOLUTION INTRAVENOUS CONTINUOUS
Status: DISCONTINUED | OUTPATIENT
Start: 2023-12-21 | End: 2023-12-21 | Stop reason: HOSPADM

## 2023-12-21 RX ORDER — DEXAMETHASONE SODIUM PHOSPHATE 4 MG/ML
INJECTION, SOLUTION INTRA-ARTICULAR; INTRALESIONAL; INTRAMUSCULAR; INTRAVENOUS; SOFT TISSUE AS NEEDED
Status: DISCONTINUED | OUTPATIENT
Start: 2023-12-21 | End: 2023-12-21

## 2023-12-21 RX ORDER — CEFAZOLIN SODIUM 1 G/50ML
SOLUTION INTRAVENOUS
Status: COMPLETED
Start: 2023-12-21 | End: 2023-12-21

## 2023-12-21 RX ORDER — OXYCODONE HYDROCHLORIDE 5 MG/1
5 TABLET ORAL EVERY 6 HOURS PRN
Qty: 12 TABLET | Refills: 0 | Status: SHIPPED | OUTPATIENT
Start: 2023-12-21 | End: 2023-12-28

## 2023-12-21 RX ORDER — MUPIROCIN 20 MG/G
1 OINTMENT TOPICAL ONCE
Status: CANCELLED | OUTPATIENT
Start: 2023-12-21 | End: 2023-12-21

## 2023-12-21 RX ORDER — LIDOCAINE HCL/PF 100 MG/5ML
SYRINGE (ML) INTRAVENOUS AS NEEDED
Status: DISCONTINUED | OUTPATIENT
Start: 2023-12-21 | End: 2023-12-21

## 2023-12-21 RX ORDER — LIDOCAINE HYDROCHLORIDE 10 MG/ML
0.1 INJECTION INFILTRATION; PERINEURAL ONCE
Status: DISCONTINUED | OUTPATIENT
Start: 2023-12-21 | End: 2023-12-21 | Stop reason: HOSPADM

## 2023-12-21 RX ORDER — FENTANYL CITRATE 50 UG/ML
INJECTION, SOLUTION INTRAMUSCULAR; INTRAVENOUS AS NEEDED
Status: DISCONTINUED | OUTPATIENT
Start: 2023-12-21 | End: 2023-12-21

## 2023-12-21 RX ORDER — ONDANSETRON HYDROCHLORIDE 2 MG/ML
4 INJECTION, SOLUTION INTRAVENOUS ONCE AS NEEDED
Status: DISCONTINUED | OUTPATIENT
Start: 2023-12-21 | End: 2023-12-21 | Stop reason: HOSPADM

## 2023-12-21 RX ORDER — LIDOCAINE HYDROCHLORIDE AND EPINEPHRINE 10; 10 MG/ML; UG/ML
INJECTION, SOLUTION INFILTRATION; PERINEURAL AS NEEDED
Status: DISCONTINUED | OUTPATIENT
Start: 2023-12-21 | End: 2023-12-21 | Stop reason: HOSPADM

## 2023-12-21 RX ORDER — HYDRALAZINE HYDROCHLORIDE 20 MG/ML
5 INJECTION INTRAMUSCULAR; INTRAVENOUS EVERY 30 MIN PRN
Status: DISCONTINUED | OUTPATIENT
Start: 2023-12-21 | End: 2023-12-21 | Stop reason: HOSPADM

## 2023-12-21 RX ORDER — LABETALOL HYDROCHLORIDE 5 MG/ML
5 INJECTION, SOLUTION INTRAVENOUS ONCE AS NEEDED
Status: DISCONTINUED | OUTPATIENT
Start: 2023-12-21 | End: 2023-12-21 | Stop reason: HOSPADM

## 2023-12-21 RX ORDER — ROCURONIUM BROMIDE 10 MG/ML
INJECTION, SOLUTION INTRAVENOUS AS NEEDED
Status: DISCONTINUED | OUTPATIENT
Start: 2023-12-21 | End: 2023-12-21

## 2023-12-21 RX ORDER — HYDRALAZINE HYDROCHLORIDE 20 MG/ML
INJECTION INTRAMUSCULAR; INTRAVENOUS AS NEEDED
Status: DISCONTINUED | OUTPATIENT
Start: 2023-12-21 | End: 2023-12-21

## 2023-12-21 RX ORDER — ASPIRIN 81 MG
100 TABLET, DELAYED RELEASE (ENTERIC COATED) ORAL 2 TIMES DAILY PRN
Qty: 10 TABLET | Refills: 0 | Status: ON HOLD | OUTPATIENT
Start: 2023-12-21 | End: 2024-01-09 | Stop reason: ALTCHOICE

## 2023-12-21 RX ORDER — ALBUTEROL SULFATE 0.83 MG/ML
2.5 SOLUTION RESPIRATORY (INHALATION) ONCE AS NEEDED
Status: DISCONTINUED | OUTPATIENT
Start: 2023-12-21 | End: 2023-12-21 | Stop reason: HOSPADM

## 2023-12-21 RX ORDER — ACETAMINOPHEN 325 MG/1
650 TABLET ORAL EVERY 4 HOURS PRN
Status: DISCONTINUED | OUTPATIENT
Start: 2023-12-21 | End: 2023-12-21 | Stop reason: HOSPADM

## 2023-12-21 RX ORDER — MEPERIDINE HYDROCHLORIDE 25 MG/ML
12.5 INJECTION INTRAMUSCULAR; INTRAVENOUS; SUBCUTANEOUS EVERY 10 MIN PRN
Status: DISCONTINUED | OUTPATIENT
Start: 2023-12-21 | End: 2023-12-21 | Stop reason: HOSPADM

## 2023-12-21 RX ORDER — INDOCYANINE GREEN AND WATER 25 MG
KIT INJECTION AS NEEDED
Status: DISCONTINUED | OUTPATIENT
Start: 2023-12-21 | End: 2023-12-21

## 2023-12-21 RX ORDER — MIDAZOLAM HYDROCHLORIDE 1 MG/ML
INJECTION, SOLUTION INTRAMUSCULAR; INTRAVENOUS AS NEEDED
Status: DISCONTINUED | OUTPATIENT
Start: 2023-12-21 | End: 2023-12-21

## 2023-12-21 RX ADMIN — HYDROMORPHONE HYDROCHLORIDE 0.5 MG: 1 INJECTION, SOLUTION INTRAMUSCULAR; INTRAVENOUS; SUBCUTANEOUS at 15:31

## 2023-12-21 RX ADMIN — FENTANYL CITRATE 50 MCG: 50 INJECTION, SOLUTION INTRAMUSCULAR; INTRAVENOUS at 12:16

## 2023-12-21 RX ADMIN — PROPOFOL 130 MG: 10 INJECTION, EMULSION INTRAVENOUS at 12:16

## 2023-12-21 RX ADMIN — HYDROMORPHONE HYDROCHLORIDE 0.5 MG: 2 INJECTION, SOLUTION INTRAMUSCULAR; INTRAVENOUS; SUBCUTANEOUS at 14:12

## 2023-12-21 RX ADMIN — MEPERIDINE HYDROCHLORIDE 12.5 MG: 25 INJECTION INTRAMUSCULAR; INTRAVENOUS; SUBCUTANEOUS at 17:15

## 2023-12-21 RX ADMIN — ROCURONIUM BROMIDE 10 MG: 10 INJECTION, SOLUTION INTRAVENOUS at 13:06

## 2023-12-21 RX ADMIN — MIDAZOLAM 2 MG: 1 INJECTION INTRAMUSCULAR; INTRAVENOUS at 12:09

## 2023-12-21 RX ADMIN — INDOCYANINE GREEN AND WATER 7.5 MG: KIT at 12:51

## 2023-12-21 RX ADMIN — DEXAMETHASONE SODIUM PHOSPHATE 4 MG: 4 INJECTION, SOLUTION INTRAMUSCULAR; INTRAVENOUS at 12:30

## 2023-12-21 RX ADMIN — SODIUM CHLORIDE, POTASSIUM CHLORIDE, SODIUM LACTATE AND CALCIUM CHLORIDE: 600; 310; 30; 20 INJECTION, SOLUTION INTRAVENOUS at 12:54

## 2023-12-21 RX ADMIN — ROCURONIUM BROMIDE 50 MG: 10 INJECTION, SOLUTION INTRAVENOUS at 12:17

## 2023-12-21 RX ADMIN — HYDRALAZINE HYDROCHLORIDE 5 MG: 20 INJECTION INTRAMUSCULAR; INTRAVENOUS at 13:39

## 2023-12-21 RX ADMIN — SUGAMMADEX 200 MG: 100 INJECTION, SOLUTION INTRAVENOUS at 15:10

## 2023-12-21 RX ADMIN — CEFAZOLIN SODIUM 1 G: 1 INJECTION, SOLUTION INTRAVENOUS at 12:25

## 2023-12-21 RX ADMIN — SODIUM CHLORIDE, POTASSIUM CHLORIDE, SODIUM LACTATE AND CALCIUM CHLORIDE: 600; 310; 30; 20 INJECTION, SOLUTION INTRAVENOUS at 12:07

## 2023-12-21 RX ADMIN — FENTANYL CITRATE 50 MCG: 50 INJECTION, SOLUTION INTRAMUSCULAR; INTRAVENOUS at 12:35

## 2023-12-21 RX ADMIN — MEPERIDINE HYDROCHLORIDE 12.5 MG: 25 INJECTION INTRAMUSCULAR; INTRAVENOUS; SUBCUTANEOUS at 15:58

## 2023-12-21 RX ADMIN — PROPOFOL 20 MG: 10 INJECTION, EMULSION INTRAVENOUS at 14:00

## 2023-12-21 RX ADMIN — HYDROMORPHONE HYDROCHLORIDE 0.5 MG: 2 INJECTION, SOLUTION INTRAMUSCULAR; INTRAVENOUS; SUBCUTANEOUS at 12:52

## 2023-12-21 RX ADMIN — LIDOCAINE HYDROCHLORIDE 40 MG: 20 INJECTION INTRAVENOUS at 12:16

## 2023-12-21 RX ADMIN — ONDANSETRON 4 MG: 2 INJECTION INTRAMUSCULAR; INTRAVENOUS at 14:37

## 2023-12-21 SDOH — HEALTH STABILITY: MENTAL HEALTH: CURRENT SMOKER: 0

## 2023-12-21 ASSESSMENT — COLUMBIA-SUICIDE SEVERITY RATING SCALE - C-SSRS
2. HAVE YOU ACTUALLY HAD ANY THOUGHTS OF KILLING YOURSELF?: NO
6. HAVE YOU EVER DONE ANYTHING, STARTED TO DO ANYTHING, OR PREPARED TO DO ANYTHING TO END YOUR LIFE?: NO
1. IN THE PAST MONTH, HAVE YOU WISHED YOU WERE DEAD OR WISHED YOU COULD GO TO SLEEP AND NOT WAKE UP?: NO

## 2023-12-21 ASSESSMENT — PAIN SCALES - GENERAL
PAINLEVEL_OUTOF10: 0 - NO PAIN
PAINLEVEL_OUTOF10: 8
PAIN_LEVEL: 0
PAINLEVEL_OUTOF10: 7
PAINLEVEL_OUTOF10: 0 - NO PAIN

## 2023-12-21 ASSESSMENT — PAIN - FUNCTIONAL ASSESSMENT
PAIN_FUNCTIONAL_ASSESSMENT: 0-10

## 2023-12-21 NOTE — ANESTHESIA PROCEDURE NOTES
Airway  Date/Time: 12/21/2023 12:18 PM  Urgency: elective    Airway not difficult    Staffing  Performed: CRNA   Authorized by: Darren Smith MD    Performed by: RAVI Anderson-CRNA  Patient location during procedure: OR    Indications and Patient Condition  Indications for airway management: anesthesia  Spontaneous ventilation: present  Sedation level: deep  Preoxygenated: yes  Patient position: sniffing  MILS maintained throughout  Mask difficulty assessment: 1 - vent by mask  Planned trial extubation    Final Airway Details  Final airway type: endotracheal airway      Successful airway: ETT  Cuffed: yes   Successful intubation technique: direct laryngoscopy  Facilitating devices/methods: intubating stylet  Endotracheal tube insertion site: oral  Blade: Marla  Blade size: #4  ETT size (mm): 7.0  Cormack-Lehane Classification: grade I - full view of glottis  Placement verified by: chest auscultation, capnometry and palpation of cuff   Measured from: lips  ETT to lips (cm): 21  Number of attempts at approach: 1  Ventilation between attempts: none  Number of other approaches attempted: 0

## 2023-12-21 NOTE — ANESTHESIA PREPROCEDURE EVALUATION
Patient: Ciara Martin    Procedure Information       Date/Time: 12/21/23 1200    Procedure: RIGHT LOWER EXTREMITY SKIN GRAFT APPLICATION/ INSET SURAL FLAP/ WOUND VAC (Right)    Location: [facility] OR 04 / Virtual [facility] OR    Surgeons: Jose Gutierrez MD            Relevant Problems   Cardiovascular   (+) Primary hypertension       Clinical information reviewed:    Allergies  Meds     OB Status           NPO Detail:  NPO/Void Status  Date of Last Liquid: 12/21/23  Time of Last Liquid: 0600  Date of Last Solid: 12/20/23  Time of Last Solid: 2000         Physical Exam    Airway  Mallampati: II  TM distance: >3 FB  Neck ROM: full     Cardiovascular   Rhythm: regular  Rate: normal     Dental - normal exam     Pulmonary   Breath sounds clear to auscultation     Abdominal        Anesthesia Plan    ASA 3     general     The patient is not a current smoker.  Patient was not previously instructed to abstain from smoking on day of procedure.  Patient did not smoke on day of procedure.    intravenous induction   Postoperative administration of opioids is intended.  Anesthetic plan and risks discussed with patient.  Use of blood products discussed with patient who consented to blood products.    Plan discussed with CRNA.

## 2023-12-21 NOTE — ANESTHESIA POSTPROCEDURE EVALUATION
Patient: Ciara Martin    Procedure Summary       Date: 12/21/23 Room / Location: [facility] OR 04 / [facility] OR    Anesthesia Start: 1207 Anesthesia Stop:     Procedure: RIGHT LOWER EXTREMITY SKIN GRAFT APPLICATION/ INSET SURAL FLAP/ WOUND VAC (Right) Diagnosis:       Non-healing open wound of heel, right, initial encounter      (Non-healing open wound of heel, right, initial encounter [S91.301A])    Surgeons: Jose Gutierrez MD Responsible Provider: Darren Smith MD    Anesthesia Type: general ASA Status: 3            Anesthesia Type: general    Vitals Value Taken Time   /87 12/21/23 1516   Temp 36 °C (96.8 °F) 12/21/23 1515   Pulse 73 12/21/23 1517   Resp 14 12/21/23 1515   SpO2 100 % 12/21/23 1517   Vitals shown include unvalidated device data.    Anesthesia Post Evaluation    Patient location during evaluation: PACU  Patient participation: complete - patient participated  Level of consciousness: awake and alert  Pain score: 0  Pain management: adequate  Airway patency: patent  Cardiovascular status: acceptable  Respiratory status: acceptable  Hydration status: acceptable  Postoperative Nausea and Vomiting: none    No notable events documented.

## 2023-12-21 NOTE — INTERVAL H&P NOTE
All other systems have been reviewed with the patient and have been found to be negative with exception to the chief complaint as mentioned in the history of present illness.    ROS: As noted in history of present illness  - CONSTITUTIONAL: Denies weight loss, fever and chills.  - HEENT: Denies changes in vision and hearing.  - RESPIRATORY: Denies SOB and cough, difficulty breathing  - CV: Denies palpitations and CP  - GI: Denies abdominal pain, nausea, vomiting and diarrhea.  - : Denies dysuria and urinary frequency.  - MSK: Denies myalgia and joint pain.  - SKIN: Denies rash and pruritus.  - NEUROLOGICAL: Denies headache and syncope.  - PSYCHIATRIC: Denies recent changes in mood. Denies anxiety and depression.    I reviewed with the patient the remainder of the his personal, medical, surgical and social history, found not to be pertinent to chief complaint. I specifically reviewed the family history with patient, found not to be pertinent to chief complaint.  GEN: interactive and pleasant  HEAD: NCAT  Eyes: EOMI, PERRLA  Mouth: MMM  Throat: trachea midline  Cor: RRR  Pulm: nonlabored breathing  Neuro: AAOx3  Lymph: non-edematous, no LN-opathy  EXT: extremities perfused  Pscyh: affect, mood appropriate      H&P reviewed. The patient was examined and there are no changes to the H&P.

## 2023-12-22 ASSESSMENT — PAIN SCALES - GENERAL: PAINLEVEL_OUTOF10: 4

## 2023-12-28 ENCOUNTER — APPOINTMENT (OUTPATIENT)
Dept: PLASTIC SURGERY | Facility: CLINIC | Age: 68
End: 2023-12-28
Payer: COMMERCIAL

## 2023-12-28 ENCOUNTER — OFFICE VISIT (OUTPATIENT)
Dept: PLASTIC SURGERY | Facility: CLINIC | Age: 68
End: 2023-12-28
Payer: COMMERCIAL

## 2023-12-28 VITALS — WEIGHT: 135 LBS | BODY MASS INDEX: 23.92 KG/M2 | HEIGHT: 63 IN

## 2023-12-28 DIAGNOSIS — D03.71: ICD-10-CM

## 2023-12-28 PROCEDURE — 1159F MED LIST DOCD IN RCRD: CPT | Performed by: PHYSICIAN ASSISTANT

## 2023-12-28 PROCEDURE — 99024 POSTOP FOLLOW-UP VISIT: CPT | Performed by: PHYSICIAN ASSISTANT

## 2023-12-28 PROCEDURE — 1126F AMNT PAIN NOTED NONE PRSNT: CPT | Performed by: PHYSICIAN ASSISTANT

## 2023-12-28 PROCEDURE — 1036F TOBACCO NON-USER: CPT | Performed by: PHYSICIAN ASSISTANT

## 2023-12-28 NOTE — PROGRESS NOTES
Plastic Surgery Clinic Visit    Patient Name: Ciara Martin  MRN: [id number]  Date:  12/28/23     History of Present Illness  Ciara Martin is a 68 y.o. female with a past medical history of right plantar heel with an ulcerated acral melanoma.     Subjective    Ciara Martin is an 68 y.o. female who had surgery for Non-healing open wound of heel, right, initial encounter. She is s/p right heel melanoma wide excision with SLNB with Dr. Millan and right reverse sural flap delay with placement of integra on 11/22/23. She returned to the OR 12/21/23 for a Delayed reverse sural flap and STSG with wound vac placed. She returns today for wound vac removal. She denies F/C/N/V or SOB. She reports no complications with the wound vac and has remained non weight bearing as instructed.    Past Medical History:   Diagnosis Date    Breast cancer (CMS/HCC)     Hypertension      Past Surgical History:   Procedure Laterality Date    BREAST LUMPECTOMY Left     DECEMBER 2010     No Known Allergies    Current Outpatient Medications:     amLODIPine (Norvasc) 10 mg tablet, Take 1 tablet (10 mg) by mouth once daily., Disp: , Rfl:     calcium carbonate (Oscal) 500 mg calcium (1,250 mg) tablet, Take 1 tablet (1,250 mg) by mouth 2 times a day with meals., Disp: , Rfl:     docusate sodium (Colace) 100 mg tablet, Take 1 tablet (100 mg) by mouth 2 times a day as needed for constipation for up to 10 doses., Disp: 10 tablet, Rfl: 0    gabapentin (Neurontin) 100 mg capsule, Take 1 capsule (100 mg) by mouth 3 times a day., Disp: 90 capsule, Rfl: 5    metoprolol succinate XL (Toprol-XL) 50 mg 24 hr tablet, Take 1 tablet (50 mg) by mouth once daily. Do not crush or chew., Disp: , Rfl:     multivitamin tablet, Take 1 tablet by mouth once daily., Disp: , Rfl:     ondansetron ODT (Zofran-ODT) 4 mg disintegrating tablet, Take 1 tablet (4 mg) by mouth every 8 hours if needed for nausea or vomiting., Disp: 6 tablet, Rfl: 0     oxyCODONE (Roxicodone) 5 mg immediate release tablet, Take 1 tablet (5 mg) by mouth every 6 hours if needed for severe pain (7 - 10) for up to 12 doses., Disp: 12 tablet, Rfl: 0    sodium hypochlorite (Dakin's HALF-Strength) 0.25 % external solution, Apply topically 2 times a day. Apply damp to dry dressing to heel wound with dakins solution, Disp: 473 mL, Rfl: 1   No family history on file.  Social History     Tobacco Use    Smoking status: Former     Packs/day: 0.25     Years: 10.00     Additional pack years: 0.00     Total pack years: 2.50     Types: Cigarettes     Start date: 1976     Quit date: 1986     Years since quittin.0    Smokeless tobacco: Never   Vaping Use    Vaping Use: Never used   Substance Use Topics    Alcohol use: Not Currently     Comment: havent't had a drink in over a year / social drinker    Drug use: Never     Review of Systems     Objective        Physical Exam     Gen: interactive and pleasant  Head: NCAT  Eyes: EOMI, PERRLA  Mouth: MMM  Throat: trachea midline  Cor: RRR  Pulm: nonlabored breathing  Abd: s/nt/nd  Neuro: AAOx3  Ext: extremities perfused     Focused exam of the: RLE     Wound vac removed without difficulty. STSG is in place with good uptake. Wound edges are viable. No evidence of necrosis. Posterior right calf with sural flap that is well perfused. There is mild edema of ankle.  There is no erythema or concerns for infection. No evidence of ischemia or delayed wound healing.      Diagnostics   No results found for this or any previous visit (from the past 24 hour(s)).  Electrocardiogram, 12-lead    Result Date: 2023  Sinus bradycardia Left axis deviation Moderate voltage criteria for LVH, may be normal variant Abnormal ECG When compared with ECG of 10-AUG-2001 16:38, QRS duration has increased T wave inversion now evident in Inferior leads      Assessment/Plan  Ciara Montes De OcaShannonMyrna is an 68 y.o. female who had surgery for Non-healing open wound of heel,  right, initial encounter. She is s/p right heel melanoma wide excision with SLNB with Dr. Millan and right reverse sural flap delay with placement of integra on 11/22/23. She returned to the OR 12/21/23 for a Delayed reverse sural flap and STSG with wound vac placed. She returns today for wound vac removal.     -Wound vac removed without complication  -STSG intact with dissolvable sutures noted  -instructed to apply xeroform over STSG BID with ABD pads on top and ACE bandage loosely wrapped to hold in place  -[facility] was reinstated to assist with dressing changes, supplies were given to patient and  in the interim  -Continue to keep leg elevated with no pressure on your calf/heel as able.  -Continue Non weight bearing per Dr. Gutierrez  -It was a pleasure to see you today, please keep your 1/4/23 appointment as scheduled    Juli Taveras [facility]  Plastic and Reconstructive Surgery

## 2023-12-29 LAB
ATRIAL RATE: 56 BPM
P AXIS: 19 DEGREES
P OFFSET: 191 MS
P ONSET: 137 MS
PR INTERVAL: 144 MS
Q ONSET: 209 MS
QRS COUNT: 9 BEATS
QRS DURATION: 98 MS
QT INTERVAL: 470 MS
QTC CALCULATION(BAZETT): 453 MS
QTC FREDERICIA: 459 MS
R AXIS: -35 DEGREES
T AXIS: -10 DEGREES
T OFFSET: 444 MS
VENTRICULAR RATE: 56 BPM

## 2024-01-04 ENCOUNTER — OFFICE VISIT (OUTPATIENT)
Dept: PLASTIC SURGERY | Facility: CLINIC | Age: 69
End: 2024-01-04
Payer: COMMERCIAL

## 2024-01-04 VITALS
HEIGHT: 63 IN | BODY MASS INDEX: 23.92 KG/M2 | WEIGHT: 135 LBS | DIASTOLIC BLOOD PRESSURE: 69 MMHG | SYSTOLIC BLOOD PRESSURE: 105 MMHG | HEART RATE: 73 BPM

## 2024-01-04 DIAGNOSIS — D03.8: ICD-10-CM

## 2024-01-04 DIAGNOSIS — C43.71: Primary | ICD-10-CM

## 2024-01-04 PROBLEM — D03.9 MELANOMA IN SITU (MULTI): Status: ACTIVE | Noted: 2024-01-04

## 2024-01-04 PROCEDURE — 1036F TOBACCO NON-USER: CPT | Performed by: PHYSICIAN ASSISTANT

## 2024-01-04 PROCEDURE — 3078F DIAST BP <80 MM HG: CPT | Performed by: PHYSICIAN ASSISTANT

## 2024-01-04 PROCEDURE — 3074F SYST BP LT 130 MM HG: CPT | Performed by: PHYSICIAN ASSISTANT

## 2024-01-04 PROCEDURE — 1126F AMNT PAIN NOTED NONE PRSNT: CPT | Performed by: PHYSICIAN ASSISTANT

## 2024-01-04 PROCEDURE — 1159F MED LIST DOCD IN RCRD: CPT | Performed by: PHYSICIAN ASSISTANT

## 2024-01-04 PROCEDURE — 99024 POSTOP FOLLOW-UP VISIT: CPT | Performed by: PHYSICIAN ASSISTANT

## 2024-01-04 NOTE — LETTER
January 4, 2024     Patient: Ciara Martin   YOB: 1955   Date of Visit: 1/4/2024       To Whom It May Concern:    Ciara Martin was seen in my clinic on 1/4/2024 at 11:40 am. Please excuse her  Praful Norris from  from work on this day and Tuesday January 9th, 2024 to accompany the patient to these appointments.  If you have any questions or concerns, please don't hesitate to call.         Sincerely,         Danita Gonzalez PA-C        CC: No Recipients

## 2024-01-04 NOTE — H&P (VIEW-ONLY)
Department of Plastic and Reconstructive Surgery            Post Operative Visit    Date: 01/04/24  Date of Surgery: 11/22/23. 12/21/23    Subjective   Ciara Martin is a 68 y.o. female who presents for POV. They are s/p right heel melanoma wide excision with SLNB with Dr Millan and right reverse sural flap delayed with placement of Integra on 11/22/23. She returned to the OR on 12/21/23 for delayed reverse sural flap and STSG with wound vac placement with Dr. Gutierrez.     She returns today for POV. She was seen in clinic on 12/28/23 and had wound vac removed. She states that she has been having home care daily. She endorsed that her skin graft was intact at her previous visit. She states a few days ago a mesh material came off her wound during a dressing change, she did not think that it was the skin graft. She endorses moderately controlled pain. She endorses improvement in the thigh donor site pain.     Objective   Vital Signs:   Vitals:    01/04/24 1201   BP: 105/69   Pulse: 73     Gen: interactive and pleasant  Head: NCAT  Eyes: EOMI, PERRLA  Mouth: MMM  Throat: trachea midline  Cor: RRR  Pulm: nonlabored breathing  Abd: s/nt/nd  Neuro: AAOx3  Ext: extremities perfused    Focused exam of the: right lower extremity.    STSG of the foot and ankle has fallen off there is no remaining skin graft. There is beefy red granulation tissue present with white slough. The sural flap is well perfused with no evidence of necrosis. Posterior right calf skin graft is intact with yellow slough likely xeroform sticking to it.       Assessment/Plan     Ciara Martin is a 68 y.o. female who presents for POV. They are s/p right heel melanoma wide excision with SLNB with Dr Millan and right reverse sural flap delayed with placement of Integra on 11/22/23. She returned to the OR on 12/21/23 for delayed reverse sural flap and STSG with wound vac placement with Dr. Gutierrez.    She presents today  for POV. She has complete failure of STSG of the right foot and ankle. She was seen by myself and Dr. Gutierrez today who advised repeat skin graft ASAP. She is to return to dakins and dry gauze until surgical date.     Plan:   Case request for STSG on 1/9/24 submitted.   Dakins and dry gauze until surgery  Will need to bring home wound vac to surgery    I spent 30 minutes with this patient. Greater than 50% of this time was spent in the counselling and/or coordination of care of this patient.  This note was created using voice recognition software and was not corrected for typographical or grammatical errors.    Signature: Danita Gonzalez PA-C  Date: 01/04/24

## 2024-01-08 ENCOUNTER — ANESTHESIA EVENT (OUTPATIENT)
Dept: OPERATING ROOM | Facility: CLINIC | Age: 69
End: 2024-01-08
Payer: COMMERCIAL

## 2024-01-09 ENCOUNTER — HOSPITAL ENCOUNTER (OUTPATIENT)
Facility: CLINIC | Age: 69
Setting detail: OUTPATIENT SURGERY
Discharge: HOME | End: 2024-01-09
Attending: SURGERY | Admitting: SURGERY
Payer: COMMERCIAL

## 2024-01-09 ENCOUNTER — ANESTHESIA (OUTPATIENT)
Dept: OPERATING ROOM | Facility: CLINIC | Age: 69
End: 2024-01-09
Payer: COMMERCIAL

## 2024-01-09 VITALS
DIASTOLIC BLOOD PRESSURE: 73 MMHG | RESPIRATION RATE: 12 BRPM | TEMPERATURE: 97.2 F | OXYGEN SATURATION: 99 % | HEART RATE: 67 BPM | SYSTOLIC BLOOD PRESSURE: 144 MMHG

## 2024-01-09 DIAGNOSIS — D03.8: Primary | ICD-10-CM

## 2024-01-09 DIAGNOSIS — G89.18 POST-OP PAIN: ICD-10-CM

## 2024-01-09 DIAGNOSIS — C43.71: ICD-10-CM

## 2024-01-09 PROCEDURE — 15100 SPLT AGRFT T/A/L 1ST 100SQCM: CPT | Performed by: SURGERY

## 2024-01-09 PROCEDURE — 87206 SMEAR FLUORESCENT/ACID STAI: CPT | Performed by: SURGERY

## 2024-01-09 PROCEDURE — 87186 SC STD MICRODIL/AGAR DIL: CPT | Performed by: SURGERY

## 2024-01-09 PROCEDURE — 15002 WOUND PREP TRK/ARM/LEG: CPT | Performed by: PHYSICIAN ASSISTANT

## 2024-01-09 PROCEDURE — 2500000001 HC RX 250 WO HCPCS SELF ADMINISTERED DRUGS (ALT 637 FOR MEDICARE OP): Performed by: ANESTHESIOLOGY

## 2024-01-09 PROCEDURE — 2500000004 HC RX 250 GENERAL PHARMACY W/ HCPCS (ALT 636 FOR OP/ED): Performed by: ANESTHESIOLOGY

## 2024-01-09 PROCEDURE — 2500000004 HC RX 250 GENERAL PHARMACY W/ HCPCS (ALT 636 FOR OP/ED): Performed by: SURGERY

## 2024-01-09 PROCEDURE — 2500000005 HC RX 250 GENERAL PHARMACY W/O HCPCS: Performed by: SURGERY

## 2024-01-09 PROCEDURE — 7100000001 HC RECOVERY ROOM TIME - INITIAL BASE CHARGE: Performed by: SURGERY

## 2024-01-09 PROCEDURE — 15002 WOUND PREP TRK/ARM/LEG: CPT | Performed by: SURGERY

## 2024-01-09 PROCEDURE — 15273 SKIN SUB GRFT T/ARM/LG CHILD: CPT | Performed by: SURGERY

## 2024-01-09 PROCEDURE — 7100000010 HC PHASE TWO TIME - EACH INCREMENTAL 1 MINUTE: Performed by: SURGERY

## 2024-01-09 PROCEDURE — 3700000001 HC GENERAL ANESTHESIA TIME - INITIAL BASE CHARGE: Performed by: SURGERY

## 2024-01-09 PROCEDURE — 87102 FUNGUS ISOLATION CULTURE: CPT | Performed by: SURGERY

## 2024-01-09 PROCEDURE — 15101 SPLT AGRFT T/A/L EA ADDL 100: CPT | Performed by: SURGERY

## 2024-01-09 PROCEDURE — 2500000005 HC RX 250 GENERAL PHARMACY W/O HCPCS: Performed by: ANESTHESIOLOGIST ASSISTANT

## 2024-01-09 PROCEDURE — 2500000001 HC RX 250 WO HCPCS SELF ADMINISTERED DRUGS (ALT 637 FOR MEDICARE OP): Performed by: SURGERY

## 2024-01-09 PROCEDURE — 2500000004 HC RX 250 GENERAL PHARMACY W/ HCPCS (ALT 636 FOR OP/ED): Performed by: ANESTHESIOLOGIST ASSISTANT

## 2024-01-09 PROCEDURE — 3700000002 HC GENERAL ANESTHESIA TIME - EACH INCREMENTAL 1 MINUTE: Performed by: SURGERY

## 2024-01-09 PROCEDURE — 3600000002 HC OR TIME - INITIAL BASE CHARGE - PROCEDURE LEVEL TWO: Performed by: SURGERY

## 2024-01-09 PROCEDURE — 3600000007 HC OR TIME - EACH INCREMENTAL 1 MINUTE - PROCEDURE LEVEL TWO: Performed by: SURGERY

## 2024-01-09 PROCEDURE — 15003 WOUND PREP ADDL 100 CM: CPT | Performed by: PHYSICIAN ASSISTANT

## 2024-01-09 PROCEDURE — 2780000003 HC OR 278 NO HCPCS: Performed by: SURGERY

## 2024-01-09 PROCEDURE — A15273 PR APP SKN SUB GRFT T/A/L AREA/>100SCM 1ST 100SCM: Performed by: ANESTHESIOLOGIST ASSISTANT

## 2024-01-09 PROCEDURE — 15003 WOUND PREP ADDL 100 CM: CPT | Performed by: SURGERY

## 2024-01-09 PROCEDURE — 7100000002 HC RECOVERY ROOM TIME - EACH INCREMENTAL 1 MINUTE: Performed by: SURGERY

## 2024-01-09 PROCEDURE — 7100000009 HC PHASE TWO TIME - INITIAL BASE CHARGE: Performed by: SURGERY

## 2024-01-09 PROCEDURE — A4217 STERILE WATER/SALINE, 500 ML: HCPCS | Performed by: SURGERY

## 2024-01-09 PROCEDURE — A15273 PR APP SKN SUB GRFT T/A/L AREA/>100SCM 1ST 100SCM: Performed by: ANESTHESIOLOGY

## 2024-01-09 RX ORDER — EPINEPHRINE 1 MG/ML
INJECTION INTRAMUSCULAR; INTRAVENOUS; SUBCUTANEOUS AS NEEDED
Status: DISCONTINUED | OUTPATIENT
Start: 2024-01-09 | End: 2024-01-09 | Stop reason: HOSPADM

## 2024-01-09 RX ORDER — CEFAZOLIN 1 G/1
INJECTION, POWDER, FOR SOLUTION INTRAVENOUS AS NEEDED
Status: DISCONTINUED | OUTPATIENT
Start: 2024-01-09 | End: 2024-01-09

## 2024-01-09 RX ORDER — HYDROCODONE BITARTRATE AND ACETAMINOPHEN 5; 325 MG/1; MG/1
1 TABLET ORAL EVERY 6 HOURS PRN
Qty: 15 TABLET | Refills: 0 | Status: SHIPPED | OUTPATIENT
Start: 2024-01-09 | End: 2024-01-16

## 2024-01-09 RX ORDER — ONDANSETRON HYDROCHLORIDE 2 MG/ML
4 INJECTION, SOLUTION INTRAVENOUS ONCE AS NEEDED
Status: DISCONTINUED | OUTPATIENT
Start: 2024-01-09 | End: 2024-01-09 | Stop reason: HOSPADM

## 2024-01-09 RX ORDER — PROPOFOL 10 MG/ML
INJECTION, EMULSION INTRAVENOUS AS NEEDED
Status: DISCONTINUED | OUTPATIENT
Start: 2024-01-09 | End: 2024-01-09

## 2024-01-09 RX ORDER — ALBUTEROL SULFATE 0.83 MG/ML
2.5 SOLUTION RESPIRATORY (INHALATION) ONCE AS NEEDED
Status: DISCONTINUED | OUTPATIENT
Start: 2024-01-09 | End: 2024-01-09 | Stop reason: HOSPADM

## 2024-01-09 RX ORDER — ACETAMINOPHEN 325 MG/1
TABLET ORAL AS NEEDED
Status: DISCONTINUED | OUTPATIENT
Start: 2024-01-09 | End: 2024-01-09

## 2024-01-09 RX ORDER — KETOROLAC TROMETHAMINE 30 MG/ML
30 INJECTION, SOLUTION INTRAMUSCULAR; INTRAVENOUS ONCE
Status: COMPLETED | OUTPATIENT
Start: 2024-01-09 | End: 2024-01-09

## 2024-01-09 RX ORDER — LIDOCAINE HYDROCHLORIDE 20 MG/ML
INJECTION, SOLUTION INFILTRATION; PERINEURAL AS NEEDED
Status: DISCONTINUED | OUTPATIENT
Start: 2024-01-09 | End: 2024-01-09

## 2024-01-09 RX ORDER — OXYCODONE HYDROCHLORIDE 5 MG/1
5 TABLET ORAL EVERY 4 HOURS PRN
Status: DISCONTINUED | OUTPATIENT
Start: 2024-01-09 | End: 2024-01-09 | Stop reason: HOSPADM

## 2024-01-09 RX ORDER — CIPROFLOXACIN 500 MG/1
500 TABLET ORAL 2 TIMES DAILY
Qty: 20 TABLET | Refills: 0 | Status: SHIPPED | OUTPATIENT
Start: 2024-01-09 | End: 2024-01-19

## 2024-01-09 RX ORDER — LIDOCAINE HYDROCHLORIDE 10 MG/ML
INJECTION INFILTRATION; PERINEURAL AS NEEDED
Status: DISCONTINUED | OUTPATIENT
Start: 2024-01-09 | End: 2024-01-09 | Stop reason: HOSPADM

## 2024-01-09 RX ORDER — SODIUM CHLORIDE, SODIUM LACTATE, POTASSIUM CHLORIDE, CALCIUM CHLORIDE 600; 310; 30; 20 MG/100ML; MG/100ML; MG/100ML; MG/100ML
100 INJECTION, SOLUTION INTRAVENOUS CONTINUOUS
Status: DISCONTINUED | OUTPATIENT
Start: 2024-01-09 | End: 2024-01-09 | Stop reason: HOSPADM

## 2024-01-09 RX ORDER — HYDROMORPHONE HYDROCHLORIDE 1 MG/ML
0.4 INJECTION, SOLUTION INTRAMUSCULAR; INTRAVENOUS; SUBCUTANEOUS
Status: DISCONTINUED | OUTPATIENT
Start: 2024-01-09 | End: 2024-01-09 | Stop reason: HOSPADM

## 2024-01-09 RX ORDER — ONDANSETRON HYDROCHLORIDE 2 MG/ML
INJECTION, SOLUTION INTRAVENOUS AS NEEDED
Status: DISCONTINUED | OUTPATIENT
Start: 2024-01-09 | End: 2024-01-09

## 2024-01-09 RX ORDER — SUCCINYLCHOLINE CHLORIDE 20 MG/ML
INJECTION INTRAMUSCULAR; INTRAVENOUS AS NEEDED
Status: DISCONTINUED | OUTPATIENT
Start: 2024-01-09 | End: 2024-01-09

## 2024-01-09 RX ORDER — DEXAMETHASONE SODIUM PHOSPHATE 4 MG/ML
INJECTION, SOLUTION INTRA-ARTICULAR; INTRALESIONAL; INTRAMUSCULAR; INTRAVENOUS; SOFT TISSUE AS NEEDED
Status: DISCONTINUED | OUTPATIENT
Start: 2024-01-09 | End: 2024-01-09

## 2024-01-09 RX ORDER — LIDOCAINE HYDROCHLORIDE AND EPINEPHRINE 10; 10 MG/ML; UG/ML
INJECTION, SOLUTION INFILTRATION; PERINEURAL AS NEEDED
Status: DISCONTINUED | OUTPATIENT
Start: 2024-01-09 | End: 2024-01-09 | Stop reason: HOSPADM

## 2024-01-09 RX ORDER — PHENYLEPHRINE HCL IN 0.9% NACL 0.4MG/10ML
SYRINGE (ML) INTRAVENOUS AS NEEDED
Status: DISCONTINUED | OUTPATIENT
Start: 2024-01-09 | End: 2024-01-09

## 2024-01-09 RX ORDER — LIDOCAINE IN NACL,ISO-OSMOT/PF 30 MG/3 ML
0.1 SYRINGE (ML) INJECTION ONCE
Status: DISCONTINUED | OUTPATIENT
Start: 2024-01-09 | End: 2024-01-09 | Stop reason: HOSPADM

## 2024-01-09 RX ORDER — FENTANYL CITRATE 50 UG/ML
INJECTION, SOLUTION INTRAMUSCULAR; INTRAVENOUS AS NEEDED
Status: DISCONTINUED | OUTPATIENT
Start: 2024-01-09 | End: 2024-01-09

## 2024-01-09 RX ORDER — ASPIRIN 81 MG
100 TABLET, DELAYED RELEASE (ENTERIC COATED) ORAL 2 TIMES DAILY PRN
Qty: 10 TABLET | Refills: 0 | Status: SHIPPED | OUTPATIENT
Start: 2024-01-09

## 2024-01-09 RX ORDER — PROPOFOL 10 MG/ML
INJECTION, EMULSION INTRAVENOUS CONTINUOUS PRN
Status: DISCONTINUED | OUTPATIENT
Start: 2024-01-09 | End: 2024-01-09

## 2024-01-09 RX ORDER — SODIUM CHLORIDE 0.9 G/100ML
IRRIGANT IRRIGATION AS NEEDED
Status: DISCONTINUED | OUTPATIENT
Start: 2024-01-09 | End: 2024-01-09 | Stop reason: HOSPADM

## 2024-01-09 RX ORDER — BUPIVACAINE HYDROCHLORIDE 5 MG/ML
INJECTION, SOLUTION PERINEURAL AS NEEDED
Status: DISCONTINUED | OUTPATIENT
Start: 2024-01-09 | End: 2024-01-09 | Stop reason: HOSPADM

## 2024-01-09 RX ORDER — MEPERIDINE HYDROCHLORIDE 25 MG/ML
12.5 INJECTION INTRAMUSCULAR; INTRAVENOUS; SUBCUTANEOUS 2 TIMES DAILY PRN
Status: DISCONTINUED | OUTPATIENT
Start: 2024-01-09 | End: 2024-01-09 | Stop reason: HOSPADM

## 2024-01-09 RX ORDER — BUPIVACAINE HYDROCHLORIDE 2.5 MG/ML
INJECTION, SOLUTION EPIDURAL; INFILTRATION; INTRACAUDAL AS NEEDED
Status: DISCONTINUED | OUTPATIENT
Start: 2024-01-09 | End: 2024-01-09 | Stop reason: HOSPADM

## 2024-01-09 RX ORDER — MIDAZOLAM HYDROCHLORIDE 1 MG/ML
INJECTION, SOLUTION INTRAMUSCULAR; INTRAVENOUS AS NEEDED
Status: DISCONTINUED | OUTPATIENT
Start: 2024-01-09 | End: 2024-01-09

## 2024-01-09 RX ADMIN — PROPOFOL 25 MCG/KG/MIN: 10 INJECTION, EMULSION INTRAVENOUS at 14:19

## 2024-01-09 RX ADMIN — SUCCINYLCHOLINE CHLORIDE 60 MG: 20 INJECTION, SOLUTION INTRAMUSCULAR; INTRAVENOUS at 14:13

## 2024-01-09 RX ADMIN — DEXAMETHASONE SODIUM PHOSPHATE 8 MG: 4 INJECTION, SOLUTION INTRAMUSCULAR; INTRAVENOUS at 14:19

## 2024-01-09 RX ADMIN — OXYCODONE 5 MG: 5 TABLET ORAL at 16:25

## 2024-01-09 RX ADMIN — Medication 200 MCG: at 15:02

## 2024-01-09 RX ADMIN — SODIUM CHLORIDE, SODIUM LACTATE, POTASSIUM CHLORIDE, AND CALCIUM CHLORIDE: .6; .31; .03; .02 INJECTION, SOLUTION INTRAVENOUS at 14:06

## 2024-01-09 RX ADMIN — KETOROLAC TROMETHAMINE 30 MG: 30 INJECTION, SOLUTION INTRAMUSCULAR at 17:07

## 2024-01-09 RX ADMIN — MEPERIDINE HYDROCHLORIDE 12.5 MG: 25 INJECTION INTRAMUSCULAR; INTRAVENOUS; SUBCUTANEOUS at 16:45

## 2024-01-09 RX ADMIN — ONDANSETRON 4 MG: 2 INJECTION INTRAMUSCULAR; INTRAVENOUS at 15:42

## 2024-01-09 RX ADMIN — LIDOCAINE HYDROCHLORIDE 100 MG: 20 INJECTION, SOLUTION INFILTRATION; PERINEURAL at 14:13

## 2024-01-09 RX ADMIN — HYDROMORPHONE HYDROCHLORIDE 0.2 MG: 0.2 INJECTION, SOLUTION INTRAMUSCULAR; INTRAVENOUS; SUBCUTANEOUS at 16:38

## 2024-01-09 RX ADMIN — PROPOFOL 170 MG: 10 INJECTION, EMULSION INTRAVENOUS at 14:13

## 2024-01-09 RX ADMIN — HYDROMORPHONE HYDROCHLORIDE 0.4 MG: 1 INJECTION, SOLUTION INTRAMUSCULAR; INTRAVENOUS; SUBCUTANEOUS at 16:08

## 2024-01-09 RX ADMIN — MIDAZOLAM 2 MG: 1 INJECTION INTRAMUSCULAR; INTRAVENOUS at 14:06

## 2024-01-09 RX ADMIN — FENTANYL CITRATE 100 MCG: 50 INJECTION, SOLUTION INTRAMUSCULAR; INTRAVENOUS at 14:13

## 2024-01-09 RX ADMIN — ACETAMINOPHEN 650 MG: 325 TABLET ORAL at 13:30

## 2024-01-09 RX ADMIN — CEFAZOLIN 2 G: 1 INJECTION, POWDER, FOR SOLUTION INTRAMUSCULAR; INTRAVENOUS at 14:19

## 2024-01-09 ASSESSMENT — PAIN SCALES - GENERAL
PAINLEVEL_OUTOF10: 7
PAINLEVEL_OUTOF10: 6
PAINLEVEL_OUTOF10: 5 - MODERATE PAIN
PAINLEVEL_OUTOF10: 7
PAINLEVEL_OUTOF10: 8
PAINLEVEL_OUTOF10: 2
PAINLEVEL_OUTOF10: 0 - NO PAIN
PAINLEVEL_OUTOF10: 6
PAINLEVEL_OUTOF10: 5 - MODERATE PAIN
PAINLEVEL_OUTOF10: 2
PAINLEVEL_OUTOF10: 7
PAINLEVEL_OUTOF10: 7

## 2024-01-09 ASSESSMENT — PAIN DESCRIPTION - ORIENTATION
ORIENTATION: RIGHT
ORIENTATION: RIGHT

## 2024-01-09 ASSESSMENT — PAIN DESCRIPTION - LOCATION: LOCATION: OTHER (COMMENT)

## 2024-01-09 NOTE — OP NOTE
Application Skin Graft Lower Extremity, split thickness skin graft of the foot with wound vac (R) Operative Note     Date: 2024  OR Location: [facility] [place] [facility]    Name: Ciara Martin, : 1955, Age: 68 y.o., MRN: [id number], Sex: female    Diagnosis  Pre-op Diagnosis     * Melanoma in situ of other site (CMS/HCC) [D03.8] Post-op Diagnosis     * Melanoma in situ of other site (CMS/HCC) [D03.8]     Procedures  Debridement right heel in preparation for skin grafting, 17 x 7 cm = 119 cm²  Split-thickness skin graft to right heel 17 x 7 cm = 119 cm²  Application skin substitute to right donor thigh  Placement of VAC bolster greater than 50 cm²      Surgeons      * Jose Gutierrez - Primary    Resident/Fellow/Other Assistant:  Surgeon(s) and Role:     * Danita Gonzalez PA-C - Assisting  Given the inherent complexity of this surgery, a second surgeon or a surgically skilled physician assistant was necessary for the successful completion of this entire operative procedure. Danita Gonzalez served as the surgical assistant and was present for the entire operative procedure and participated in all aspects of patient care. This included transporting the patient to the operating room and entering room with the patient, assisting with preoperative positioning, first assisting throughout the entire surgical procedure by functioning as a second assistant surgeon, assisting with surgical closure, and finally accompanying the patient to recovery.    Procedure Summary  Anesthesia: General  ASA: III  Anesthesia Staff: Anesthesiologist: Levi Lara MD  C-AA: JOE Pickett  Estimated Blood Loss: 150mL  Intra-op Medications:   Medication Name Total Dose   sodium chloride 0.9 % irrigation solution 1,000 mL   lidocaine-epinephrine (Xylocaine W/EPI) 1 %-1:100,000 injection 10 mL   mineral oil, light topical 50 mL   bupivacaine PF (Marcaine) 0.25 % (2.5 mg/mL) injection 10 mL   lidocaine (Xylocaine) 10 mg/mL (1  %) injection 60 mL   BUPivacaine HCl (Marcaine) 0.5 % (5 mg/mL) injection 20 mL   EPINEPHrine (Adrenalin) injection 1 mg              Anesthesia Record               Intraprocedure I/O Totals          Intake    Propofol Drip 0.00 mL    The total shown is the total volume documented since Anesthesia Start was filed.    Total Intake 0 mL       Output    Est. Blood Loss 150 mL    Total Output 150 mL       Net    Net Volume -150 mL          Specimen:   ID Type Source Tests Collected by Time   1 : DEEP RIGHT HEEL CULTURE Tissue ABSCESS SURGICAL PATHOLOGY EXAM, DERMPATH-SURGICAL PATHOLOGY, AFB CULTURE/SMEAR, FUNGAL CULTURE/SMEAR, TISSUE/WOUND CULTURE/SMEAR Jose Gutierrez MD 1/9/2024 1501        Staff:   Circulator: María Oviedo RN  Relief Circulator: Jana Lawson RN  Relief Scrub: Corine Claros RN  Scrub Person: Fernanda Sampson              Indications: Ciara Martin is an 68 y.o. female who is having surgery for Melanoma in situ of other site (CMS/[facility]) [D03.8]. Ciara Martin is a 68 y.o. female who presents for POV. They are s/p right heel melanoma wide excision with SLNB with Dr Millan and right reverse sural flap delayed with placement of Integra on 11/22/23. She returned to the OR on 12/21/23 for delayed reverse sural flap and STSG with wound vac placement     She presents Monday for POV with complete failure of STSG of the right foot with proximal calf donor site skin graft was intact with 100% take.  He returned to the OR today for split-thickness skin graft    INFORMED CONSENT  Patient was told the risks, benefits, INDICATIONS, contraindications, and alternatives to the procedure. Risks include but not limited to pain, infection, bleeding, hematoma, seroma, injury to neurovascular and tendinous structures, infection causing flap/graft failure, need for subsequent skin grafting and debridement, and need for subsequent surgeries.      The patient demonstrated understanding of these  risks and agreed to proceed with surgery.  Advance directives discussed.  Team approach explained.       The patient consented and wished to proceed with the procedure and for medical photography if needed.     PROCEDURAL PAUSE  Prior to the beginning of the procedure, the patient's correct identity, side, site, and procedure to be performed were verified.  The patient was given intravenous antibiotics prior to skin incision.     PROCEDURAL NOTE  Ciara was brought to the operative theater at which point she remained on the transport cart and our general anesthesia colleagues administered general endotracheal anesthesia   On the transport cart the right thigh was prepped and draped assessable fashion, and a split-thickness skin graft was harvested from the anterior right thigh proximal to the previous split-thickness donor site in order to keep her left leg usable for ambulation.  We used a dermatome set to 1/10,000 of an inch, with a 3 inch guard, we then injected 60 cc of Exparel solution into the right thigh for postoperative analgesia, her major complaint previous surgery was pain at the donor site, and so Integra thin skin was placed over the donor site, sutured in with 4-0 plain gut suture, tacked down with Xeroform and staples followed by ABD followed by Ace bandage.  The patient was then turned prone onto the operating room table, and the right heel was prepped and draped in a standard sterile fashion.    We began by performing sharp/excisional debridement with 15 blade scalpel of the entirety of the failed skin graft and the exposed granulation tissue, this was performed with 15 blade scalpel, the total amount of excisional debridement performed with 17 x 7 cm in preparation for skin grafting.  We took cultures to ensure that there was no infection.  We then meshed the skin graft 1:1.5 and customized it into place on the right ankle with running 4-0 plain gut suture.  After this Mepitel was placed followed  by customized VAC bolster.  Bulky compressive dressing was then placed over the foot and posterior calf with care to prevent direct compression of the tubing onto the posterior calf.       The patient tolerated the procedure well and was awakened from anesthesia without any difficulties, she was transferred to the patient in stable condition, all needle counts were correct.     POST OP PLAN:  Ciara will remain outpatient, we will schedule her for removal of VAC bolster in 5 to 7 days.  She may touchdown weight-bear if she decides to she may sponge bath      Jose Gutierrez  Phone Number: [phone]
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
## 2024-01-09 NOTE — DISCHARGE INSTRUCTIONS
Plastic Surgery                Post Operative Instructions    Office Phone: [phone] or contact central scheduling  After-Hours Patient line: [phone]  (Use this number for medical questions/concerns only after 4pm or on the weekends)      Activity:   -ok for toe touch weight bearing, do not place direct pressure on your heel    Wound/Dressing Care:   -you have a wound vac this is to stay in place until your follow up visit  -do not get wound vac wet. The ace wrap and foam dressing about your foot may stay in place until follow up visit  -your skin graft donor site on your thigh-ace wrap may be removed on post op da 2 (1/11/24), there is a yellow dressing in place that is stapled to your leg this is to stay on until office visit. You may leave open to air, do not get this wet  -you may sponge bath or shower around your right leg but the skin graft thigh donor site and your wound vac cannot get wet.     Pain:      -For pain control we recommend alternating between tylenol and Ibuprofen every 3 hours for the first 3-5 days after surgery. Please monitor your Tylenol (acetaminophen) intake as your prescription pain medication has 325mg of Tylenol (acetaminophen) in it, do no exceed the max daily dose of 4,000mg in 24 hours. Use prescription pain medication for severe 7/10 pain or for break through pain.        May have Tylenol after 0730pm    May take ibuprofen after 11 pm

## 2024-01-09 NOTE — ANESTHESIA PROCEDURE NOTES
Airway  Date/Time: 1/9/2024 2:15 PM  Urgency: elective    Airway not difficult    Staffing  Performed: JOE and attending   Authorized by: Levi Lara MD    Performed by: JOE Pickett  Patient location during procedure: OR    Indications and Patient Condition  Indications for airway management: anesthesia and airway protection  Spontaneous ventilation: present  Sedation level: deep  Preoxygenated: yes  Patient position: sniffing  Mask difficulty assessment: 1 - vent by mask  No planned trial extubation    Final Airway Details  Final airway type: endotracheal airway      Successful airway: ETT  Cuffed: yes   Successful intubation technique: direct laryngoscopy  Blade: Marla  Blade size: #3  ETT size (mm): 7.0  Cormack-Lehane Classification: grade IIa - partial view of glottis  Placement verified by: chest auscultation and capnometry   Measured from: teeth  ETT to teeth (cm): 22  Number of attempts at approach: 1

## 2024-01-10 NOTE — ANESTHESIA POSTPROCEDURE EVALUATION
Patient: Ciara Martin    Procedure Summary       Date: 01/09/24 Room / Location: [facility] [place] [facility] / [facility] [place] [facility]    Anesthesia Start: 1406 Anesthesia Stop: 1556    Procedure: Application Skin Graft Lower Extremity, split thickness skin graft of the foot with wound vac (Right: Foot) Diagnosis:       Melanoma in situ of other site (CMS/HCC)      (Melanoma in situ of other site (CMS/HCC) [D03.8])    Surgeons: Jose Gutierrez MD Responsible Provider: Levi Lara MD    Anesthesia Type: general ASA Status: 3            Anesthesia Type: general    Vitals Value Taken Time   /75 01/09/24 1715   Temp 36.2 °C (97.2 °F) 01/09/24 1715   Pulse 64 01/09/24 1715   Resp 12 01/09/24 1715   SpO2 99 % 01/09/24 1715       Anesthesia Post Evaluation    Patient location during evaluation: bedside  Patient participation: complete - patient participated  Level of consciousness: awake  Pain management: adequate  Airway patency: patent  Two or more strategies used to mitigate risk of obstructive sleep apnea  Cardiovascular status: acceptable  Respiratory status: acceptable  Hydration status: acceptable  Postoperative Nausea and Vomiting: none  Comments: Had some shivers treated with low dose of Demerol    Titrated pain medications for heel pain relief    No notable events documented.

## 2024-01-12 DIAGNOSIS — Z22.322 MRSA (METHICILLIN RESISTANT STAPH AUREUS) CULTURE POSITIVE: Primary | ICD-10-CM

## 2024-01-12 LAB
BACTERIA SPEC CULT: ABNORMAL
GRAM STN SPEC: ABNORMAL
GRAM STN SPEC: ABNORMAL

## 2024-01-12 RX ORDER — SULFAMETHOXAZOLE AND TRIMETHOPRIM 800; 160 MG/1; MG/1
1 TABLET ORAL 2 TIMES DAILY
Qty: 28 TABLET | Refills: 0 | Status: SHIPPED | OUTPATIENT
Start: 2024-01-12 | End: 2024-01-26

## 2024-01-17 ENCOUNTER — APPOINTMENT (OUTPATIENT)
Dept: PLASTIC SURGERY | Facility: CLINIC | Age: 69
End: 2024-01-17
Payer: COMMERCIAL

## 2024-01-18 ENCOUNTER — OFFICE VISIT (OUTPATIENT)
Dept: PLASTIC SURGERY | Facility: CLINIC | Age: 69
End: 2024-01-18
Payer: COMMERCIAL

## 2024-01-18 ENCOUNTER — OFFICE VISIT (OUTPATIENT)
Dept: HEMATOLOGY/ONCOLOGY | Facility: CLINIC | Age: 69
End: 2024-01-18
Payer: COMMERCIAL

## 2024-01-18 VITALS
DIASTOLIC BLOOD PRESSURE: 75 MMHG | OXYGEN SATURATION: 98 % | SYSTOLIC BLOOD PRESSURE: 121 MMHG | RESPIRATION RATE: 18 BRPM | TEMPERATURE: 97.7 F | HEART RATE: 81 BPM

## 2024-01-18 VITALS — DIASTOLIC BLOOD PRESSURE: 74 MMHG | SYSTOLIC BLOOD PRESSURE: 112 MMHG | HEART RATE: 73 BPM

## 2024-01-18 DIAGNOSIS — C43.71: Primary | ICD-10-CM

## 2024-01-18 DIAGNOSIS — C43.9 ACRAL LENTIGINOUS MELANOMA (MULTI): Primary | ICD-10-CM

## 2024-01-18 DIAGNOSIS — C77.9 MELANOMA METASTATIC TO LYMPH NODE (MULTI): ICD-10-CM

## 2024-01-18 PROCEDURE — 3078F DIAST BP <80 MM HG: CPT | Performed by: PHYSICIAN ASSISTANT

## 2024-01-18 PROCEDURE — 1159F MED LIST DOCD IN RCRD: CPT | Performed by: INTERNAL MEDICINE

## 2024-01-18 PROCEDURE — 1125F AMNT PAIN NOTED PAIN PRSNT: CPT | Performed by: INTERNAL MEDICINE

## 2024-01-18 PROCEDURE — 99215 OFFICE O/P EST HI 40 MIN: CPT | Performed by: INTERNAL MEDICINE

## 2024-01-18 PROCEDURE — 3074F SYST BP LT 130 MM HG: CPT | Performed by: INTERNAL MEDICINE

## 2024-01-18 PROCEDURE — 99024 POSTOP FOLLOW-UP VISIT: CPT | Performed by: PHYSICIAN ASSISTANT

## 2024-01-18 PROCEDURE — 1036F TOBACCO NON-USER: CPT | Performed by: PHYSICIAN ASSISTANT

## 2024-01-18 PROCEDURE — 3078F DIAST BP <80 MM HG: CPT | Performed by: INTERNAL MEDICINE

## 2024-01-18 PROCEDURE — 1159F MED LIST DOCD IN RCRD: CPT | Performed by: PHYSICIAN ASSISTANT

## 2024-01-18 PROCEDURE — 1125F AMNT PAIN NOTED PAIN PRSNT: CPT | Performed by: PHYSICIAN ASSISTANT

## 2024-01-18 PROCEDURE — 3074F SYST BP LT 130 MM HG: CPT | Performed by: PHYSICIAN ASSISTANT

## 2024-01-18 PROCEDURE — 99205 OFFICE O/P NEW HI 60 MIN: CPT | Performed by: INTERNAL MEDICINE

## 2024-01-18 PROCEDURE — 1036F TOBACCO NON-USER: CPT | Performed by: INTERNAL MEDICINE

## 2024-01-18 ASSESSMENT — ENCOUNTER SYMPTOMS
CONFUSION: 0
CHILLS: 0
HEMATURIA: 0
DEPRESSION: 0
VOMITING: 0
ADENOPATHY: 0
MYALGIAS: 0
FREQUENCY: 0
DIARRHEA: 0
EXTREMITY WEAKNESS: 0
CONSTIPATION: 0
BACK PAIN: 0
SHORTNESS OF BREATH: 0
TROUBLE SWALLOWING: 0
SORE THROAT: 0
CHEST TIGHTNESS: 0
OCCASIONAL FEELINGS OF UNSTEADINESS: 0
DIFFICULTY URINATING: 0
PALPITATIONS: 0
NAUSEA: 0
HEMOPTYSIS: 0
FEVER: 0
EYE PROBLEMS: 0
SEIZURES: 0
APPETITE CHANGE: 0
DYSURIA: 0
ABDOMINAL PAIN: 0
SCLERAL ICTERUS: 0
LOSS OF SENSATION IN FEET: 0
HOT FLASHES: 0
DIZZINESS: 0
LEG SWELLING: 0
FATIGUE: 0
NERVOUS/ANXIOUS: 0
COUGH: 0
FLANK PAIN: 0

## 2024-01-18 ASSESSMENT — PATIENT HEALTH QUESTIONNAIRE - PHQ9
1. LITTLE INTEREST OR PLEASURE IN DOING THINGS: NOT AT ALL
SUM OF ALL RESPONSES TO PHQ9 QUESTIONS 1 AND 2: 0
2. FEELING DOWN, DEPRESSED OR HOPELESS: NOT AT ALL

## 2024-01-18 ASSESSMENT — COLUMBIA-SUICIDE SEVERITY RATING SCALE - C-SSRS
1. IN THE PAST MONTH, HAVE YOU WISHED YOU WERE DEAD OR WISHED YOU COULD GO TO SLEEP AND NOT WAKE UP?: NO
2. HAVE YOU ACTUALLY HAD ANY THOUGHTS OF KILLING YOURSELF?: NO
6. HAVE YOU EVER DONE ANYTHING, STARTED TO DO ANYTHING, OR PREPARED TO DO ANYTHING TO END YOUR LIFE?: NO

## 2024-01-18 ASSESSMENT — PAIN SCALES - GENERAL: PAINLEVEL: 5

## 2024-01-18 ASSESSMENT — VISUAL ACUITY: OU: 1

## 2024-01-18 NOTE — PATIENT INSTRUCTIONS
Ms. Ciara GREG Martin ,  It was a pleasure talking to you today.    As discussed, we are referring you back to Dr. Inna Toure for further care and management. Please do not hesitate to reach out to our office should you wish an appointment.     Please make sure to schedule your appointments as we discussed. Your appointments should also appear in your MyChart.     In case of an emergency please dial 911 or report to your nearest Emergency Room.  For all other questions, please do not hesitate to reach out to us at the number listed below.    Thank you for choosing [facility] at [facility] [place].   We appreciate your visit.     MD Sandi Del Toro APRN Taylor Costello, RN ([facility] location)  Farheen Gallegos RN ([place] location)    Sarcoma and Cutaneous Oncology team    Phone: [phone]  Fax: [phone].

## 2024-01-18 NOTE — PROGRESS NOTES
".Patient ID: Ciara Martin is a 68 y.o. female.  Referring Physician: No referring provider defined for this encounter.  Primary Care Provider: August Corea MD     Chief Complaint   Patient presents with    New Patient Visit    Melanoma     Stage IIIC acral melanoma        Ms. Ciara Martin is referred by Dr. Juve Millan for comanagement of stage IIIC Acral melanoma. She met with our medical oncology team on 1/18/24    Referring Surgeon: Dr. Juve Millan   Dermatologist: Oh Mcdaniel (Poditary). Dermatology yet to be determined.   Date of first meeting with our team: 1/18/24    Date of First meeting with surgical team: 10/26/24  Melanoma type: Cutaneous (Acral melanoma of the heel)  Location of primary site: Right Heel  Date of WLE and SLNB: 11/16/24  Final pathology: Breslow Depth- 7.6mm; Ulceration status-  Yes; LVI- No; Other high risk features- None  [place] lymph node status: 2/3 positive for 0.01mm and 0.02mm deposit.  Final Stage: Stage IIIC ([id number])    BRAF status: Unknown  Initial MRI brain with and without contrast: 11/15/23- Unremarkable.  Initial Full body PET scan: 11/15/23- Unremarkable.     Summary:  Ms. 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 S/p WLE and SLNB on 11/16/23. She has 2/3 lymph node positive with picomets. She met medical oncology on 1/18/24. She was recommended 1 year of immunotherapy. She was already following Dr. Inna Toure- hence she was referred back to Dr. Inna Toure.         Subjective   Please refer to \"Notes/Cancer History\" above for complete History of present illness.     Ms Ciara Martin is here with her . She wants to understand the future of treatment. She is very scared of needles and wants to know if there is any way we could help her with this.      Review of Systems:   Review of Systems   Constitutional:  Negative for appetite change, chills, fatigue and fever.        Sitting in a " wheelchair   HENT:   Negative for hearing loss, lump/mass, mouth sores, sore throat and trouble swallowing.    Eyes:  Negative for eye problems and icterus.   Respiratory:  Negative for chest tightness, cough, hemoptysis and shortness of breath.    Cardiovascular:  Negative for chest pain, leg swelling and palpitations.   Gastrointestinal:  Negative for abdominal pain, constipation, diarrhea, nausea and vomiting.   Endocrine: Negative for hot flashes.   Genitourinary:  Negative for difficulty urinating, dysuria, frequency and hematuria.    Musculoskeletal:  Negative for back pain, flank pain, gait problem and myalgias.   Skin:  Negative for itching and rash.        Right leg wrapped in bandage.   Neurological:  Negative for dizziness, extremity weakness, gait problem and seizures.   Hematological:  Negative for adenopathy.   Psychiatric/Behavioral:  Negative for confusion and depression. The patient is not nervous/anxious.          MEDICAL HISTORY  Past Medical History:   Diagnosis Date    Breast cancer (CMS/[facility])     Hypertension        FAMILY HISTORY  No family history on file.    TOBACCO HISTORY  Tobacco Use: Medium Risk (1/8/2024)    Patient History     Smoking Tobacco Use: Former     Smokeless Tobacco Use: Never     Passive Exposure: Not on file       SOCIAL HISTORY  Social Connections: Not on file        Outpatient Medication Profile:  Current Outpatient Medications on File Prior to Visit   Medication Sig Dispense Refill    amLODIPine (Norvasc) 10 mg tablet Take 1 tablet (10 mg) by mouth once daily.      calcium carbonate (Oscal) 500 mg calcium (1,250 mg) tablet Take 1 tablet (1,250 mg) by mouth 2 times a day with meals.      ciprofloxacin (Cipro) 500 mg tablet Take 1 tablet (500 mg) by mouth 2 times a day for 10 days. 20 tablet 0    docusate sodium (Colace) 100 mg tablet Take 1 tablet (100 mg) by mouth 2 times a day as needed for constipation for up to 10 doses. 10 tablet 0    metoprolol succinate XL  (Toprol-XL) 50 mg 24 hr tablet Take 1 tablet (50 mg) by mouth once daily. Do not crush or chew.      multivitamin tablet Take 1 tablet by mouth once daily.      sodium hypochlorite (Dakin's HALF-Strength) 0.25 % external solution Apply topically 2 times a day. Apply damp to dry dressing to heel wound with dakins solution 473 mL 1    sulfamethoxazole-trimethoprim (Bactrim DS) 800-160 mg tablet Take 1 tablet by mouth 2 times a day for 14 days. 28 tablet 0    [] HYDROcodone-acetaminophen (Norco) 5-325 mg tablet Take 1 tablet by mouth every 6 hours if needed for severe pain (7 - 10) for up to 15 doses. 15 tablet 0     No current facility-administered medications on file prior to visit.         Performance Status:  Symptomatic; fully ambulatory     Vitals and Measurements:   /75 (BP Location: Right arm, Patient Position: Sitting)   Pulse 81   Temp 36.5 °C (97.7 °F) (Temporal)   Resp 18   LMP  (LMP Unknown)   SpO2 98%       Physical Exam:   Physical Exam  Constitutional:       General: She is awake.      Appearance: Normal appearance. She is well-developed.      Comments: Sitting in a wheelchair.   HENT:      Head: Normocephalic and atraumatic.   Eyes:      General: Lids are normal. Vision grossly intact.   Neck:      Thyroid: No thyroid mass.   Cardiovascular:      Rate and Rhythm: Normal rate and regular rhythm.      Heart sounds: Normal heart sounds, S1 normal and S2 normal.   Pulmonary:      Effort: Pulmonary effort is normal.      Breath sounds: Normal breath sounds and air entry. No decreased breath sounds.   Abdominal:      General: Abdomen is flat. Bowel sounds are normal.      Palpations: Abdomen is soft.      Tenderness: There is no abdominal tenderness.   Musculoskeletal:      Cervical back: Normal range of motion and neck supple. No edema or rigidity.   Lymphadenopathy:      Upper Body:      Right upper body: No axillary adenopathy.      Lower Body: No right inguinal adenopathy. No left  inguinal adenopathy.   Skin:     General: Skin is warm and moist.      Capillary Refill: Capillary refill takes less than 2 seconds.      Coloration: Skin is not jaundiced.      Findings: No lesion.      Comments: Right leg wrapped in bandage.   Neurological:      Mental Status: She is alert and oriented to person, place, and time.      Cranial Nerves: Cranial nerves 2-12 are intact.      Sensory: Sensation is intact.      Motor: Motor function is intact.   Psychiatric:         Attention and Perception: Attention normal.         Mood and Affect: Mood and affect normal.         Speech: Speech normal.         Behavior: Behavior is cooperative.          Lab Results:  I have reviewed these laboratory results:     Admission on 01/09/2024, Discharged on 01/09/2024   Component Date Value Ref Range Status    AFB Culture 01/09/2024 Culture in progress and will be examined weekly. A result will be issued either when positive or after 8 weeks incubation.   Preliminary    AFB Stain 01/09/2024 No acid fast bacilli seen   Preliminary    Fungal Culture/Smear 01/09/2024 Culture in progress, a report will be issued when positive or after 2 weeks of incubation.   Preliminary    Fungal Smear 01/09/2024 No fungal elements seen   Preliminary    Tissue/Wound Culture/Smear 01/09/2024 (1+) Rare Methicillin Resistant Staphylococcus aureus (MRSA) (A)   Final    Gram Stain 01/09/2024 (1+) Rare Polymorphonuclear leukocytes   Final    Gram Stain 01/09/2024 No organisms seen   Final   Admission on 12/21/2023, Discharged on 12/21/2023   Component Date Value Ref Range Status    Ventricular Rate 12/21/2023 56  BPM Final    Atrial Rate 12/21/2023 56  BPM Final    PA Interval 12/21/2023 144  ms Final    QRS Duration 12/21/2023 98  ms Final    QT Interval 12/21/2023 470  ms Final    QTC Calculation(Bazett) 12/21/2023 453  ms Final    P Axis 12/21/2023 19  degrees Final    R Axis 12/21/2023 -35  degrees Final    T Axis 12/21/2023 -10  degrees Final     QRS Count 12/21/2023 9  beats Final    Q Onset 12/21/2023 209  ms Final    P Onset 12/21/2023 137  ms Final    P Offset 12/21/2023 191  ms Final    T Offset 12/21/2023 444  ms Final    QTC Fredericia 12/21/2023 459  ms Final    Hemoglobin 12/21/2023 13.1  12.0 - 16.0 g/dL Final    Hematocrit 12/21/2023 39.6  36.0 - 46.0 % Final    Glucose 12/21/2023 115 (H)  74 - 99 mg/dL Final    Sodium 12/21/2023 139  136 - 145 mmol/L Final    Potassium 12/21/2023 3.7  3.5 - 5.3 mmol/L Final    Chloride 12/21/2023 104  98 - 107 mmol/L Final    Bicarbonate 12/21/2023 26  21 - 32 mmol/L Final    Anion Gap 12/21/2023 13  10 - 20 mmol/L Final    Urea Nitrogen 12/21/2023 14  6 - 23 mg/dL Final    Creatinine 12/21/2023 0.86  0.50 - 1.05 mg/dL Final    eGFR 12/21/2023 74  >60 mL/min/1.73m*2 Final    Calcium 12/21/2023 9.9  8.6 - 10.3 mg/dL Final         Radiology Result:  I have reviewed the latest Imaging in PACS and the findings are noted in this note. I discussed the results of the latest imaging with the patient. All previous imaging were reviewed at the time it was completed. Full records are available in the EMR for review as well.     MR brain w and wo IV contrast  Order: [id number]  Impression  - ATROPHY AND CHRONIC MICROVASCULAR DISEASE.    - POSSIBLE FALCINE MENINGIOMA AT THE VERTEX ON THE LEFT.    - NO ACUTE INTRACRANIAL FINDINGS or metastatic disease.        Impression dictated by: Ciara Saha M.D.11/14/2023 10:01 AM      ===============================================    NM PET CT bone skull base TO MID thigh  Order: [id number]  Impression    Focal hypermetabolism of the RIGHT heel corresponding patient's history of malignant  melanoma.  No hypermetabolic adenopathy.  No distal hypermetabolic metastasis of the chest, abdomen  or pelvis.        PRELIMINARY RESULTS:  None given        Impression dictated by: Erasmo Bautista M.D.11/10/2023 12:29 PM       Pathology Results:  I have reviewed the full pathology report recorded in  the EMR. The pertinent portions indicating diagnosis are listed here in the note. for details please refer to the full report recorded in the EMR.    Dermatopathology- LAB: [id number]  Order: [id number]  Collected 11/22/2023 12:47       Status: Final result       Visible to patient: Yes (seen)       Dx: Melanoma of foot, right (CMS/HCC)    0 Result Notes      Component    FINAL DIAGNOSIS   A. RIGHT INGUINAL SENTINEL LYMPH NODE:   --ONE LYMPH NODE WITH A SOLITARY ATYPICAL MELANOCYTE, SEE COMMENT     COMMENT: Routine and immunostained sections show a solitary atypical melanocyte with multinucleation, nucleomegaly and pigmented cytoplasm abutting a capsular blood vessel positive for Melan-A, SOX10 and HMB45.  Given morphologic similarity to the patient's invasive melanoma (Part D), this isolated finding is interpreted as picometastatic melanoma  (< 0.1 mm) with no extracapsular extension.     B. RIGHT INGUINAL SENTINEL LYMPH NODE BIOPSY #2:   --ONE LYMPH NODE WITH A SOLITARY ATYPICAL MELANOCYTE, SEE COMMENT     COMMENT: Routine and immunostained sections show a solitary atypical subcapsular cell with nucleomegaly and increased cytoplasm positive for Melan-A, SOX10 and HMB45.  Given morphologic similarity to the patient's invasive melanoma (Part D), this isolated finding is interpreted as picometastatic melanoma  (< 0.1 mm) with no extracapsular extension.     C. RIGHT POPLITEAL SENTINEL LYMPH NODE:   --ONE LYMPH NODE, NEGATIVE FOR METASTATIC MELANOMA     COMMENT: Routine and immunostained sections including Melan-A, SOX10 and HMB45 were reviewed.     D. SKIN, RIGHT HEEL, EXCISION:   --MELANOMA INVASIVE, ESTIMATED BRESLOW'S DEPTH 7.6 MM, ULCERATED, NOT PRESENT AT SURGICAL MARGINS, SEE COMMENT AND SYNOPTIC REPORT     COMMENT: Sections show residual invasive and in-situ acral melanoma with broad surface ulceration and an estimated Breslow's depth of 7.6 mm.  The invasive component is multinodular and has mixed  epithelioid and spindle morphology.  Lymphovascular and perineural invasion are seen.     :  Dr. K Behrens   Electronically signed by Sandi Yancey MD on 12/4/2023 at 1226           Assessment and Plan:   Assessment/Plan      Mr. Ciara Martin is a 68 y.o. female with a diagnosis of stage IIIC acral lentiginous melanoma. Please see the evolution of the case listed above in the cancer history.     Today,   Ms. Malone is here with her . We discussed the natural history of melanoma, risk factors, prognostic factors, staging, treatment according to various stages, and the kind of treatment involved. We discussed the role of surgery, radiation, and systemic treatment.    I discussed with the patient the evolution of immunotherapy over the last many decades. We discussed that immune-checkpoint inhibitor (ICI) therapy is a kind of immunotherapy. We discussed, in simple terms, how ICI therapy works and how it is different from chemotherapy. We also discussed the common AEs that can possibly happen with ICI therapy. We also discussed the lack of biomarkers at this point, that can potentially help us to exquisitely personalize therapy for patients in terms of predicting responses and AEs. At the end of the talk, we gave her literature to read about this.    I discussed with the patient the role of adjuvant therapy in patients with stage III melanoma. Currently, immunotherapy (in the form of immune checkpoint inhibitors (ICI)) and targeted therapy (for those with BRAF positive melanoma) are approved in the management of stage III melanoma. Nivolumab and Pembrolizumab (anti-PD1) and Ipilimumab (anti-CTLA-4) are approved as adjuvant treatment for stage III melanoma. I do not recommend Ipilimumab at this point due to safety concerns which were reported in the clinical trials, and due to the fact that we have better drugs. Adjuvant therapy with Interferons has been abandoned by the melanoma  community in general due to extremely high toxicity including suicidal ideation, so we do not even talk about these in our conversations. I made her aware that at this point, there is no evidence of comparison of efficacy between pembrolizumab and nivolumab, and she can choose either one. I recommend pembrolizumab due to scheduling ease and less number of trips to the infusion center compared to the scheduling requirments of nivolumab.     We do not know the BRAF status. However, if it were positive, she is eligible for targeted therapy in the form of BRAF and MEK inhibitor. Currently only Tafinlar and Mekinist are approved for adjuvant treatment of patients with stage III melanoma. There is no formal evidence of comparison between ICI and targeted therapy, hence it is not possible to tell whether one is superior to the other. I discussed with her the adverse effects of BRAF and MEK inhibitors. Should she choose to go with BRAF and MEK inhibitors, I would want the patient o have clearance from cardiology oncology to monitor her heart function.    The surveillance plan for stage IIIC melanoma involves  1. Clinical exam and history every 3 months for the first two years and then every 6 months to complete 5 years.  2. CT scan every 3 months for first two years and then every 6 months to complete 5 years.   3. Dermatology follow up for skin check.   4. Follow up with Surgical Oncology for Ultrasound of the lymph node basin     Patient can always call me earlier if he notices any changes in his status, or any physician involved in the care feels that the patient needs to be seen by our team.      DISCLAIMER:   In preparing for this visit and writing this note, I reviewed all the previous electronic medical records (labs, imaging and medical charts) of the patient available in the physician portal. Significant findings which helped in decision making are recorded  in this chart.     The plan was discussed with the  patient. We gave him ample opportunities to ask questions. All questions were answered to his satisfaction and he verbalized understanding.       Richard Keys MD    Hematology and Oncology     Phone: [phone]  Fax: [phone].    INSTRUCTIONS FOR PATIENT  Ms. Ciara Martin ,  It was a pleasure talking to you today.    As discussed, we are referring you back to Dr. Inna Toure for further care and management. Please do not hesitate to reach out to our office should you wish an appointment.     Please make sure to schedule your appointments as we discussed. Your appointments should also appear in your MyChart.     In case of an emergency please dial 911 or report to your nearest Emergency Room.  For all other questions, please do not hesitate to reach out to us at the number listed below.    Thank you for choosing [facility].   We appreciate your visit.     MD Sandi Del Toro, RAVI Farr RN ([facility] location)  Farheen Gallegos RN ([facility])    Sarcoma and Cutaneous Oncology team    Phone: [phone]  Fax: [phone].

## 2024-01-19 ENCOUNTER — PATIENT MESSAGE (OUTPATIENT)
Dept: PLASTIC SURGERY | Facility: CLINIC | Age: 69
End: 2024-01-19
Payer: COMMERCIAL

## 2024-01-19 NOTE — PROGRESS NOTES
Department of Plastic and Reconstructive Surgery            Post Operative Visit    Date: 1/18/24  Date of Surgery: 11/22/23. 12/21/23. 1/9/24    Subjective   Ciara Martin is a 68 y.o. female who presents for POV. They are s/p right heel melanoma wide excision with SLNB with Dr Millan and right reverse sural flap delayed with placement of Integra on 11/22/23. She returned to the OR on 12/21/23 for delayed reverse sural flap and STSG with wound vac placement with Dr. Gutierrez. Post op complicated by skin graft failure now s/p STSG on 1/9/24.       She returns today for POV. She had issues with vac losing suction but this was fixed quickly by her home care company. She endorses there is odor from the wound vac. There is minimal drainage from the vac. She has been non-weight bearing. Her cultures from surgery were positive for MRSA she was initiated on bactrim therapy and has been taking this for approx 6 days.         Objective   Vital Signs:   Vitals:    01/18/24 0955   BP: 112/74   Pulse: 73     Gen: interactive and pleasant  Head: NCAT  Eyes: EOMI, PERRLA  Mouth: MMM  Throat: trachea midline  Cor: RRR  Pulm: nonlabored breathing  Abd: s/nt/nd  Neuro: AAOx3  Ext: extremities perfused    Focused exam of the: right lower extremity.    STSG of the foot and ankle with 95% graft take. There are a few areas of slough along the wound edges. Silver nitrate was applied to these areas. There is no purulent drainage from the foot or skin graft recipient site. Sural flap well perfused.     Right thigh: STSG donor site with xeroform intact. This is starting to peel away at the edges. No surrounding erythema or concerns for infection.       Assessment/Plan     Ciara Martin is a 68 y.o. female who presents for POV. They are s/p right heel melanoma wide excision with SLNB with Dr Millan and right reverse sural flap delayed with placement of Integra on 11/22/23. She returned to the OR on  12/21/23 for delayed reverse sural flap and STSG with wound vac placement with Dr. Gutierrez. Post op complicated by skin graft failure now s/p STSG on 1/9/24.     STSG graft take near 95%. Cultures from OR were positive for MRSA she has been taking bactrim per sensitivities. Continue local wound care daily and follow up in 2 weeks.     Plan:   -Apply generous bacitracin/vaseline, xeroform, ABD, ACE wrap to the skin graft daily.   -leave thigh STSG donor site open to air xeroform will peel away on its own  -toe touch weight bearing   -follow up in 2 weeks   -continue antibiotics to completion    I spent 30 minutes with this patient. Greater than 50% of this time was spent in the counselling and/or coordination of care of this patient.  This note was created using voice recognition software and was not corrected for typographical or grammatical errors.    Signature: Danita Gonzalez PA-C  Date: 1/18/24

## 2024-01-19 NOTE — TELEPHONE ENCOUNTER
From: Ciara Martin  To: Danita Gonzalez PA-C  Sent: 1/19/2024 12:29 PM EST  Subject: Home care orders?    Help! I asked several times for orders for home health care to address wound care as I a physically unable to reach my foot, due to the painful skin graft area. Neil stated she would contact Carolyn, my nurse through [employer]. As of now, there have been no orders sent. This is why I was asking for a hard copy to avoid this happening. Please get them to them ASAP! Thank you. This is the same nursing group that has been caring for me all along, no changes.

## 2024-01-29 LAB
FUNGUS SPEC CULT: NORMAL
FUNGUS SPEC FUNGUS STN: NORMAL

## 2024-02-01 ENCOUNTER — OFFICE VISIT (OUTPATIENT)
Dept: PLASTIC SURGERY | Facility: CLINIC | Age: 69
End: 2024-02-01
Payer: COMMERCIAL

## 2024-02-01 VITALS
HEIGHT: 63 IN | DIASTOLIC BLOOD PRESSURE: 83 MMHG | SYSTOLIC BLOOD PRESSURE: 126 MMHG | WEIGHT: 135 LBS | BODY MASS INDEX: 23.92 KG/M2 | HEART RATE: 66 BPM

## 2024-02-01 DIAGNOSIS — C77.9 MELANOMA METASTATIC TO LYMPH NODE (MULTI): Primary | ICD-10-CM

## 2024-02-01 PROCEDURE — 99024 POSTOP FOLLOW-UP VISIT: CPT | Performed by: PHYSICIAN ASSISTANT

## 2024-02-01 PROCEDURE — 1036F TOBACCO NON-USER: CPT | Performed by: PHYSICIAN ASSISTANT

## 2024-02-01 PROCEDURE — 3074F SYST BP LT 130 MM HG: CPT | Performed by: PHYSICIAN ASSISTANT

## 2024-02-01 PROCEDURE — 3079F DIAST BP 80-89 MM HG: CPT | Performed by: PHYSICIAN ASSISTANT

## 2024-02-01 PROCEDURE — 1159F MED LIST DOCD IN RCRD: CPT | Performed by: PHYSICIAN ASSISTANT

## 2024-02-01 PROCEDURE — 1125F AMNT PAIN NOTED PAIN PRSNT: CPT | Performed by: PHYSICIAN ASSISTANT

## 2024-02-01 NOTE — PROGRESS NOTES
Department of Plastic and Reconstructive Surgery            Post Operative Visit    Date: 02/01/24  Date of Surgery: 11/22/23. 12/21/23. 1/9/24    Subjective   Ciara Martin is a 68 y.o. female who presents for POV. They are s/p right heel melanoma wide excision with SLNB with Dr Millan and right reverse sural flap delayed with placement of Integra on 11/22/23. She returned to the OR on 12/21/23 for delayed reverse sural flap and STSG with wound vac placement with Dr. Gutierrez. Post op complicated by skin graft failure now s/p STSG on 1/9/24.       Patient returns for POV. She is recovering well. She is feeling much better. She is toe touch weight bearing. She has been continuing wound care with bacitracin, xeroform, ABD, and ace wrap.         Objective   Vital Signs:   Vitals:    02/01/24 1134   BP: 126/83   Pulse: 66     Gen: interactive and pleasant  Head: NCAT  Eyes: EOMI, PERRLA  Mouth: MMM  Throat: trachea midline  Cor: RRR  Pulm: nonlabored breathing  Abd: s/nt/nd  Neuro: AAOx3  Ext: extremities perfused    Focused exam of the: right lower extremity.    STSG of the foot and ankle with 95% graft take. There are a few areas of hypergranulation along the wound edges. Silver nitrate was applied to these areas. There is no purulent drainage from the foot or skin graft recipient site. Sural flap well perfused.     Right thigh: STSG donor site with xeroform removed. There is red skin present no wounds.       Assessment/Plan     Ciara Martin is a 68 y.o. female who presents for POV. They are s/p right heel melanoma wide excision with SLNB with Dr Millan and right reverse sural flap delayed with placement of Integra on 11/22/23. She returned to the OR on 12/21/23 for delayed reverse sural flap and STSG with wound vac placement with Dr. Gutierrez. Post op complicated by skin graft failure now s/p STSG on 1/9/24.     STSG well healed and adhered. There are a few sites of  hypergranulation present, silver nitrate applied. Patient seen by my self and Dr. Gutierrez today, recommended initiating gradual weight bearing. She can discontinue wound care and transition to daily mositurizer (aquaphor or eucerin) to the thigh, posterior calf, and heel.     Plan:   -apply eucerin or aquaphor to thigh, posterior calf, and heel sites daily   -silver nitrate applied to hypergranulation on heel today.   Continue daily showers  Gradual weight bearing, start with 5 minutes day 1, then 5min x2 on day 2, then 5min x3 on day 3. Once you reach to 5 minutes 5 times daily you may begin walking on the heel.   -follow up in 1 month    I spent 20 minutes with this patient. Greater than 50% of this time was spent in the counselling and/or coordination of care of this patient.  This note was created using voice recognition software and was not corrected for typographical or grammatical errors.    Signature: Danita Gonzalez PA-C  Date: 02/01/24

## 2024-02-27 LAB
ACID FAST STN SPEC: NORMAL
MYCOBACTERIUM SPEC CULT: NORMAL

## 2024-03-04 ENCOUNTER — APPOINTMENT (OUTPATIENT)
Dept: PLASTIC SURGERY | Facility: CLINIC | Age: 69
End: 2024-03-04
Payer: COMMERCIAL

## 2024-03-06 ENCOUNTER — OFFICE VISIT (OUTPATIENT)
Dept: PLASTIC SURGERY | Facility: CLINIC | Age: 69
End: 2024-03-06
Payer: COMMERCIAL

## 2024-03-06 VITALS
DIASTOLIC BLOOD PRESSURE: 76 MMHG | BODY MASS INDEX: 23.92 KG/M2 | HEART RATE: 69 BPM | SYSTOLIC BLOOD PRESSURE: 127 MMHG | HEIGHT: 63 IN | WEIGHT: 135 LBS

## 2024-03-06 DIAGNOSIS — C77.9 MELANOMA METASTATIC TO LYMPH NODE (MULTI): Primary | ICD-10-CM

## 2024-03-06 PROCEDURE — 99024 POSTOP FOLLOW-UP VISIT: CPT | Performed by: PHYSICIAN ASSISTANT

## 2024-03-06 PROCEDURE — 1036F TOBACCO NON-USER: CPT | Performed by: PHYSICIAN ASSISTANT

## 2024-03-06 PROCEDURE — 3074F SYST BP LT 130 MM HG: CPT | Performed by: PHYSICIAN ASSISTANT

## 2024-03-06 PROCEDURE — 3078F DIAST BP <80 MM HG: CPT | Performed by: PHYSICIAN ASSISTANT

## 2024-03-06 PROCEDURE — 1159F MED LIST DOCD IN RCRD: CPT | Performed by: PHYSICIAN ASSISTANT

## 2024-03-06 PROCEDURE — 1125F AMNT PAIN NOTED PAIN PRSNT: CPT | Performed by: PHYSICIAN ASSISTANT

## 2024-03-06 NOTE — PROGRESS NOTES
Department of Plastic and Reconstructive Surgery            Post Operative Visit    Date: 03/06/24  Date of Surgery: 11/22/23. 12/21/23. 1/9/24    Subjective   Ciara Matrin is a 68 y.o. female who presents for POV. They are s/p right heel melanoma wide excision with SLNB with Dr Millan and right reverse sural flap delayed with placement of Integra on 11/22/23. She returned to the OR on 12/21/23 for delayed reverse sural flap and STSG with wound vac placement with Dr. Gutierrez. Post op complicated by skin graft failure now s/p STSG on 1/9/24.       She returns today for 90 day POV. She is walking without difficulty. She is able to wear shoes. She endorses some continued stiffness in the ankle that is improving with movement. She denied pain.       Objective   Vital Signs:   Vitals:    03/06/24 1113   BP: 127/76   Pulse: 69     Gen: interactive and pleasant  Head: NCAT  Eyes: EOMI, PERRLA  Mouth: MMM  Throat: trachea midline  Cor: RRR  Pulm: nonlabored breathing  Abd: s/nt/nd  Neuro: AAOx3  Ext: extremities perfused    Focused exam of the: right lower extremity.    STSG of the foot and ankle with complete graft take. There is no delayed wound healing. Flap has started to decrease is size. There is mild edema of the foot and ankle improved from previous.     Right thigh: STSG donor site with red skin the is improving.       Assessment/Plan     Ciara Martin is a 68 y.o. female who presents for POV. They are s/p right heel melanoma wide excision with SLNB with Dr Millan and right reverse sural flap delayed with placement of Integra on 11/22/23. She returned to the OR on 12/21/23 for delayed reverse sural flap and STSG with wound vac placement with Dr. Gutierrez. Post op complicated by skin graft failure now s/p STSG on 1/9/24.     STSG well healed and adhered. She is to continue with PT. She has no surgical restrictions at this time. She may continue walking. We discussed scar care  with massage, topical scar products, and sunscreen. Follow up in 1 year.    Plan:   -scar care with massage, topical scar care products, and sunscreen  -continue PT  -no restrictions at this time  -follow up in 1 year. If she has concerns she may follow up sooner.     I spent 20 minutes with this patient. Greater than 50% of this time was spent in the counselling and/or coordination of care of this patient.  This note was created using voice recognition software and was not corrected for typographical or grammatical errors.    Signature: Danita Gonzalez PA-C  Date: 03/06/24

## 2024-05-29 NOTE — PROGRESS NOTES
Subjective     HPI  Ciara Martin is a 68 y.o. female here in postoperative followup after undergoing 1) Wide excision Right heel melanoma with 2cm margins 2) Right inguinal SLNB 3) Right popliteal SLNB and Right Reverse Sural Flap Delay, Placement of INTEGRA, Neurolysis of Sural Nerve on 11/22/2023 with me and Dr Gutierrez. T4bN2a, Stage IIIC.  She is currently receiving adjuvant immunotherapy which started in approximately March and she has received 3 or 4 cycles.  Now with inguinal lymphadenopathy noted on CT scan.  As I review her current CT scan and compared it to the CAT scan she had with her lymphoscintigraphy, there is definite enlargement of the dominant node from about 1 cm in November to at least 1.5 cm currently.  Additionally, there are 3 other lymph nodes which are notable and all appear slightly more prominent.  She noted swelling of her ankle after a long road trip.  Recent DVT scan showed no evidence of DVT.    Objective     Vitals:    05/30/24 0827   BP: 146/78   Pulse: 69   Resp: 16   Temp: 36.4 °C (97.5 °F)   SpO2: 98%           EXAM  In general she appears well and in no distress    Examination of her foot shows healing in 2 areas which still have some scabs.  No evidence of infection.  There is some swelling of the ankle.    On examination of her right inguinal region, there is a well-healed sentinel lymph node biopsy scar.  At that specific site, there is no underlying palpable lymphadenopathy.  More superiorly just above the level of the inguinal crease, there is a firm palpable lymphadenopathy.    PATHOLOGY  FINAL DIAGNOSIS   A. RIGHT INGUINAL SENTINEL LYMPH NODE:   --ONE LYMPH NODE WITH A SOLITARY ATYPICAL MELANOCYTE, SEE COMMENT     COMMENT: Routine and immunostained sections show a solitary atypical melanocyte with multinucleation, nucleomegaly and pigmented cytoplasm abutting a capsular blood vessel positive for Melan-A, SOX10 and HMB45.  Given morphologic similarity to the  patient's invasive melanoma (Part D), this isolated finding is interpreted as picometastatic melanoma  (< 0.1 mm) with no extracapsular extension.     B. RIGHT INGUINAL SENTINEL LYMPH NODE BIOPSY #2:   --ONE LYMPH NODE WITH A SOLITARY ATYPICAL MELANOCYTE, SEE COMMENT     COMMENT: Routine and immunostained sections show a solitary atypical subcapsular cell with nucleomegaly and increased cytoplasm positive for Melan-A, SOX10 and HMB45.  Given morphologic similarity to the patient's invasive melanoma (Part D), this isolated finding is interpreted as picometastatic melanoma  (< 0.1 mm) with no extracapsular extension.     C. RIGHT POPLITEAL SENTINEL LYMPH NODE:   --ONE LYMPH NODE, NEGATIVE FOR METASTATIC MELANOMA     COMMENT: Routine and immunostained sections including Melan-A, SOX10 and HMB45 were reviewed.     D. SKIN, RIGHT HEEL, EXCISION:   --MELANOMA INVASIVE, ESTIMATED BRESLOW'S DEPTH 7.6 MM, ULCERATED, NOT PRESENT AT SURGICAL MARGINS, SEE COMMENT AND SYNOPTIC REPORT     COMMENT: Sections show residual invasive and in-situ acral melanoma with broad surface ulceration and an estimated Breslow's depth of 7.6 mm.  The invasive component is multinodular and has mixed epithelioid and spindle morphology.  Lymphovascular and perineural invasion are seen.     :  Dr. K Behrens   Electronically signed by Sandi Yancey MD on 12/4/2023 at 1226        By the signature on this report, the individual or group listed as making the Final Interpretation/Diagnosis certifies that they have reviewed this case.    Case Summary Report   MELANOMA OF THE SKIN: Excision, Re-Excision   8th Edition - Protocol posted: 11/10/2021MELANOMA OF THE SKIN: EXCISION, RE-EXCISION  - All Specimens  SPECIMEN   Procedure  Excision     Gem node(s) biopsy   Specimen Laterality  Right   TUMOR   Tumor Site  Skin of lower limb and hip: Heel        Histologic Type  Acral melanoma   Maximum Tumor (Breslow) Thickness (Millimeters)  7.6  mm   Macroscopic Satellite Nodule(s)  Not identified   Ulceration  Present   Extent of Ulceration (Millimeters)  13 mm   Anatomic (Booker) Level  V (Melanoma invades subcutis)   Mitotic Rate  6 mitoses per mm2   Microsatellite(s)  Not identified   Lymphovascular Invasion  Present   Neurotropism  Present   Tumor-Infiltrating Lymphocytes  Present, nonbrisk   Tumor Regression  Not identified   MARGINS     Margin Status for Invasive Melanoma  All margins negative for invasive melanoma   Closest Margin Location(s) to Invasive Melanoma  9-12-3:00 margin   Distance from Invasive Melanoma to Peripheral Margin  At least: 16 mm   Distance from Invasive Melanoma to Deep Margin  At least: 9 mm   Margin Status for Melanoma in situ  All margins negative for melanoma in situ   Distance from Melanoma in Situ to Peripheral Margin  At least: 10 mm   REGIONAL LYMPH NODES     Regional Lymph Node Status  Tumor present in regional lymph node(s)   Total Number of Lymph Nodes with Tumor  2   Number of Maiden Lymph Nodes with Tumor  2   Ian Site(s) with Tumor  Capsular and subcapsular   Size of Largest Maiden Node Metastatic Deposit  Less than: 0.1 mm   Extranodal Extension  Not identified   Matted Nodes  Not identified   Total Number of Lymph Nodes Examined  3   Number of Maiden Nodes Examined  3   PATHOLOGIC STAGE CLASSIFICATION (pTNM, AJCC 8th Edition)  Classification assigned in this report includes information from a prior procedure: [id number]   pT Category  pT4b   pN Category  pN2a   .          Assessment/Plan   68-year-old woman with a history of stage IIIc melanoma with 2 out of 2 sentinel nodes positive for metastatic disease in the right inguinal region.  Her most recent CT scan that I have reviewed shows enlargement of numerous inguinal lymph nodes which is concerning for recurrent metastatic disease.  Additionally, these are palpable on exam.  She is deathly afraid of needles, and would require sedation or general  anesthesia for an ultrasound-guided biopsy.  Additionally, if noninvasive means such as PET scan demonstrated PET positivity in the right inguinal region, then this would be enough information to move forward with definitive surgery with right inguinal lymphadenectomy and sartorius muscle flap.  Given the current findings of a patient with recent stage IIIc melanoma with all sentinel nodes that were removed having metastatic melanoma as well as progression of numerous lymph nodes on CT imaging, I will order PET scan.  If the PET scan is PET positive in the inguinal lymph nodes only without other sites of metastatic disease, then we will move forward with surgery.  The nature of the surgery was discussed as were the potential short-term and long-term complications specifically lymphedema.  Knee-high compression stocking was also prescribed.    Juve Millan MD

## 2024-05-30 ENCOUNTER — OFFICE VISIT (OUTPATIENT)
Dept: SURGICAL ONCOLOGY | Facility: CLINIC | Age: 69
End: 2024-05-30
Payer: COMMERCIAL

## 2024-05-30 VITALS
WEIGHT: 145.06 LBS | OXYGEN SATURATION: 98 % | DIASTOLIC BLOOD PRESSURE: 78 MMHG | BODY MASS INDEX: 25.7 KG/M2 | HEART RATE: 69 BPM | SYSTOLIC BLOOD PRESSURE: 146 MMHG | RESPIRATION RATE: 16 BRPM | TEMPERATURE: 97.5 F

## 2024-05-30 DIAGNOSIS — C77.9 MELANOMA METASTATIC TO LYMPH NODE (MULTI): Primary | ICD-10-CM

## 2024-05-30 DIAGNOSIS — I89.0 LYMPHEDEMA: ICD-10-CM

## 2024-05-30 PROCEDURE — 3077F SYST BP >= 140 MM HG: CPT | Performed by: SURGERY

## 2024-05-30 PROCEDURE — 1159F MED LIST DOCD IN RCRD: CPT | Performed by: SURGERY

## 2024-05-30 PROCEDURE — 99214 OFFICE O/P EST MOD 30 MIN: CPT | Performed by: SURGERY

## 2024-05-30 PROCEDURE — 3078F DIAST BP <80 MM HG: CPT | Performed by: SURGERY

## 2024-05-30 PROCEDURE — 1036F TOBACCO NON-USER: CPT | Performed by: SURGERY

## 2024-05-30 PROCEDURE — 1126F AMNT PAIN NOTED NONE PRSNT: CPT | Performed by: SURGERY

## 2024-05-30 RX ORDER — LIDOCAINE 50 MG/G
OINTMENT TOPICAL AS NEEDED
Qty: 35 G | Refills: 2 | Status: SHIPPED | OUTPATIENT
Start: 2024-05-30 | End: 2024-09-27

## 2024-05-30 RX ORDER — LORAZEPAM 1 MG/1
1 TABLET ORAL ONCE
Qty: 2 TABLET | Refills: 0 | Status: SHIPPED | OUTPATIENT
Start: 2024-05-30 | End: 2024-05-30

## 2024-05-30 ASSESSMENT — PAIN SCALES - GENERAL: PAINLEVEL: 0-NO PAIN

## 2024-06-06 ENCOUNTER — HOSPITAL ENCOUNTER (OUTPATIENT)
Dept: RADIOLOGY | Facility: CLINIC | Age: 69
Discharge: HOME | End: 2024-06-06
Payer: COMMERCIAL

## 2024-06-06 PROCEDURE — 78816 PET IMAGE W/CT FULL BODY: CPT | Mod: PET TUMOR SUBSQ TX STRATEGY | Performed by: NUCLEAR MEDICINE

## 2024-06-06 PROCEDURE — 78816 PET IMAGE W/CT FULL BODY: CPT | Mod: PS

## 2024-06-06 PROCEDURE — 3430000001 HC RX 343 DIAGNOSTIC RADIOPHARMACEUTICALS: Performed by: SURGERY

## 2024-06-06 PROCEDURE — A9552 F18 FDG: HCPCS | Performed by: SURGERY

## 2024-06-06 RX ORDER — FLUDEOXYGLUCOSE F 18 200 MCI/ML
12.5 INJECTION, SOLUTION INTRAVENOUS
Status: COMPLETED | OUTPATIENT
Start: 2024-06-06 | End: 2024-06-06

## 2024-06-06 RX ADMIN — FLUDEOXYGLUCOSE F 18 12.5 MILLICURIE: 200 INJECTION, SOLUTION INTRAVENOUS at 08:12

## 2024-06-07 DIAGNOSIS — C77.9 MELANOMA METASTATIC TO LYMPH NODE (MULTI): ICD-10-CM

## 2024-06-28 ENCOUNTER — HOSPITAL ENCOUNTER (OUTPATIENT)
Dept: RADIOLOGY | Facility: HOSPITAL | Age: 69
Discharge: HOME | End: 2024-06-28
Payer: COMMERCIAL

## 2024-06-28 VITALS
TEMPERATURE: 96.4 F | HEART RATE: 69 BPM | DIASTOLIC BLOOD PRESSURE: 84 MMHG | SYSTOLIC BLOOD PRESSURE: 125 MMHG | WEIGHT: 144 LBS | BODY MASS INDEX: 25.52 KG/M2 | OXYGEN SATURATION: 96 % | HEIGHT: 63 IN | RESPIRATION RATE: 16 BRPM

## 2024-06-28 DIAGNOSIS — C77.9 MELANOMA METASTATIC TO LYMPH NODE (MULTI): ICD-10-CM

## 2024-06-28 PROCEDURE — 99152 MOD SED SAME PHYS/QHP 5/>YRS: CPT | Performed by: RADIOLOGY

## 2024-06-28 PROCEDURE — 7100000010 HC PHASE TWO TIME - EACH INCREMENTAL 1 MINUTE

## 2024-06-28 PROCEDURE — 2720000007 HC OR 272 NO HCPCS

## 2024-06-28 PROCEDURE — 38505 NEEDLE BIOPSY LYMPH NODES: CPT

## 2024-06-28 PROCEDURE — 7100000009 HC PHASE TWO TIME - INITIAL BASE CHARGE

## 2024-06-28 PROCEDURE — 99152 MOD SED SAME PHYS/QHP 5/>YRS: CPT

## 2024-06-28 PROCEDURE — 2500000004 HC RX 250 GENERAL PHARMACY W/ HCPCS (ALT 636 FOR OP/ED): Performed by: RADIOLOGY

## 2024-06-28 PROCEDURE — 2500000005 HC RX 250 GENERAL PHARMACY W/O HCPCS: Performed by: RADIOLOGY

## 2024-06-28 RX ORDER — MIDAZOLAM HYDROCHLORIDE 1 MG/ML
INJECTION, SOLUTION INTRAMUSCULAR; INTRAVENOUS
Status: COMPLETED | OUTPATIENT
Start: 2024-06-28 | End: 2024-06-28

## 2024-06-28 RX ORDER — SODIUM CHLORIDE 9 MG/ML
100 INJECTION, SOLUTION INTRAVENOUS CONTINUOUS
Status: DISCONTINUED | OUTPATIENT
Start: 2024-06-28 | End: 2024-06-29 | Stop reason: HOSPADM

## 2024-06-28 RX ORDER — LIDOCAINE HYDROCHLORIDE 10 MG/ML
INJECTION, SOLUTION EPIDURAL; INFILTRATION; INTRACAUDAL; PERINEURAL
Status: COMPLETED | OUTPATIENT
Start: 2024-06-28 | End: 2024-06-28

## 2024-06-28 RX ORDER — FENTANYL CITRATE 50 UG/ML
INJECTION, SOLUTION INTRAMUSCULAR; INTRAVENOUS
Status: COMPLETED | OUTPATIENT
Start: 2024-06-28 | End: 2024-06-28

## 2024-06-28 ASSESSMENT — PAIN SCALES - GENERAL
PAINLEVEL_OUTOF10: 0 - NO PAIN

## 2024-06-28 NOTE — PRE-PROCEDURE NOTE
Interventional Radiology Preprocedure Note    Indication for procedure: The encounter diagnosis was Melanoma metastatic to lymph node (Multi). Enlarged right inguinal lymph node. Presenting for biopsy.    Relevant review of systems: NA    Relevant Labs:   Lab Results   Component Value Date    CREATININE 0.86 12/21/2023    EGFR 74 12/21/2023       Planned Sedation/Anesthesia: Moderate    Airway assessment: normal    Directed physical examination:    Awake and alert, oriented  No acute distress  Regular rate, rhythm  Breathing non-labored    Mallampati: II (hard and soft palate, upper portion of tonsils and uvula visible)    ASA Score: ASA 2 - Patient with mild systemic disease with no functional limitations    Benefits, risks and alternatives of procedure and planned sedation have been discussed with the patient and/or their representative. All questions answered and they agree to proceed.

## 2024-06-28 NOTE — Clinical Note
Right inguinal lymph node biopsy completed. Two core samples obtained and placed in formalin for surgical pathology. One core placed in RPMI for flow cytometry.  Right groin dressed with 4x4 and tegaderm.

## 2024-07-03 LAB
CELL COUNT (BLOOD): 0.06 X10*3/UL
CELL POPULATIONS: NORMAL
DIAGNOSIS: NORMAL
FLOW DIFFERENTIAL: NORMAL
FLOW TEST ORDERED: NORMAL
LAB AP ASR DISCLAIMER: NORMAL
LAB TEST METHOD: NORMAL
LABORATORY COMMENT REPORT: NORMAL
NUMBER OF CELLS COLLECTED: NORMAL
PATH REPORT.FINAL DX SPEC: NORMAL
PATH REPORT.GROSS SPEC: NORMAL
PATH REPORT.TOTAL CANCER: NORMAL
PATH REPORT.TOTAL CANCER: NORMAL
SIGNATURE COMMENT: NORMAL
SPECIMEN VIABILITY: NORMAL

## 2025-01-22 NOTE — OP NOTE
Patients daughter called the office today requesting a medication refill for patient. They are requesting celecoxib 100 mg capsule, to be sent to [facility] in [place], [place]. States that [facility] has prescribed this before with a quantity of 30. Unable to log into our old system to see who the original prescriber is. Please advise   RIGHT LOWER EXTREMITY SKIN GRAFT APPLICATION/ INSET SURAL FLAP/ WOUND VAC (R) Operative Note     Date: 2023  OR Location: [facility]    Name: Ciara Martin, : 1955, Age: 68 y.o., MRN: [id number], Sex: female    Diagnosis  Pre-op Diagnosis     * Non-healing open wound of heel, right, initial encounter [S91.301A] Post-op Diagnosis     * Non-healing open wound of heel, right, initial encounter [S91.301A]     Procedures  1.  Inset of delayed reverse sural flap to right heel wound  2.  Debridement in preparation for grafting of right heel wound, 5 x 6 cm  3.  Split-thickness skin graft, 20 x 8 cm over flap and pedicle, 10 x 5 cm over calf donor site  4.  Placement of VAC bolster greater than 50 cm²  5.  Placement of skin substitute to thigh donor site.  6.  Injection of ICG for intraoperative flap perfusion assessment     Surgeons      * Jose Gutierrez - Primary    Resident/Fellow/Other Assistant:  Surgeon(s) and Role:     * Danita Gonzalez PA-C - Assisting  Given the inherent complexity of this surgery, a second surgeon or a surgically skilled physician assistant was necessary for the successful completion of this entire operative procedure. Danita Gonzalez served as the surgical assistant and was present for the entire operative procedure and participated in all aspects of patient care. This included transporting the patient to the operating room and entering room with the patient, assisting with preoperative positioning, first assisting throughout the entire surgical procedure by functioning as a second assistant surgeon, assisting with surgical closure, and finally accompanying the patient to recovery.      Procedure Summary  Anesthesia: General  ASA: III  Anesthesia Staff: Anesthesiologist: Darren Smith MD  CRNA: RAVI Anderson-CRNA  Estimated Blood Loss: 100mL  Intra-op Medications:   Medication Name Total Dose   ceFAZolin in dextrose (iso-os) (Ancef) IVPB  - Omnicell Override Pull  Cannot be calculated              Anesthesia Record               Intraprocedure I/O Totals          Intake    LR 1500.00 mL    Total Intake 1500 mL          Specimen: No specimens collected     Staff:   Circulator: Stephanie Felix, JADON; Leelee Bender RN  Scrub Person: Renzo Gonsalez RN         Drains and/or Catheters: * None in log *    Tourniquet Times:         Implants:  Implants       Type Name Action Serial No.      Tissue CTM Thin Connective tissue implant Implanted      Tissue CTM FLOW LV Implanted [id number]     Surgical Mesh Sling Implant WOUND MATRIX, MESHED, 4 X 5 IN (10 CM X 12.5CM) - [id number] Implanted               Findings: Intraoperative spy showed excellent perfusion of the flap    Indications: Ciara Martin is an 68 y.o. female who is having surgery for Non-healing open wound of heel, right, initial encounter [S91.301A]. They are s/p right heel melanoma wide excision with SLNB with Dr. Millan and  right reverse sural flap delay with placement of integra on 11/22/23       INFORMED CONSENT  Patient was told the risks, benefits, INDICATIONS, contraindications, and alternatives to the procedure. Risks include but not limited to pain, infection, bleeding, hematoma, seroma, injury to neurovascular and tendinous structures, bulkiness of flap, partial flap loss, total flap loss, skin graft loss,, and need for subsequent surgeries.      The patient demonstrated understanding of these risks and agreed to proceed with surgery.  Advance directives discussed.  Team approach explained.       The patient consented and wished to proceed with the procedure and for medical photography if needed.     PROCEDURAL PAUSE  Prior to the beginning of the procedure, the patient's correct identity, side, site, and procedure to be performed were verified.  The patient was given intravenous antibiotics prior to skin incision.     PROCEDURAL NOTE  Ciara was brought to the operative theater at which point She  was transferred to the operating room table.  All bony prominences were well padded, and sequential compression devices were placed to each lower extremity. Patient was then secured with safety straps.  The patient was then placed under IV sedation, and our anesthesia colleagues administered general endotracheal esthesia, the patient was then placed in a sloppy lateral position, with left side down..    The right lower extremity was prepped and draped in standard sterile fashion.    We began by reopening the previous incision of the reverse sural flap, we dissected deep into the we reached the Integra which had been placed, we identified the pedicle and leash which was 4 cm wide.  We then performed ligation of the proximal vessels, we then performed ICG intraoperative spy examination to assess the perfusion of the flap, we noted the flap to be excellently perfused with its distal blood supply.    We then turned attention to the heel wound which measured 5 x 6 cm we performed excisional debridement in preparation for skin grafting of fibrinous slough all the way down to bone.  We then de-epithelialized the flap and turned it upside down and placed it in an inset position on the heel, we customized it to the heel, we raised the lateral portion of the wounds in order to accept the flap, this was performed with interrupted 2-0 and 3-0 Vicryl suture.  We de-epithelialized a strip of skin for the pedicle along the skin covering the Achilles tendon, we were careful to perform only D epithelization in order to allow inset of the vascular pedicle and lesion.  The lesion pedicle were then inset with interrupted 3-0 Vicryl suture.  We then performed closure of the donor site, the medial portion of the donor site incision which was a lazy S incision previously, was closed with 2-0 PDS in the fascia.  We then performed circumferential pursestring suture along the donor site at the calf overlying the gastroc muscle, we did this  with a running 3-0 STRATAFIX suture in order to bring the donor site dimensions down to 10 x 5 cm, from 15 x 6.    We placed connective tissue matrix onto the wound bed at the heel, given the site was prone to adhesions postoperatively and had significant connective tissue damage with an adequate connective tissue for healing, the CTM was applied beneath the flap and between the flap and the open heel wound to supplement the damaged tissue and allow for postoperative healing.  We then injected CTM flow around the borders of the incision in order to promote tissue integration given the inadequate tissue.    We then obtained a dermatome set to 1/12,000, and harvested a split-thickness skin graft from the right lateral thigh, this was meshed 1: 1.5, and we inset this over the donor site on the proximal leg covering the wound which had measured 10 x 5 cm, we then inset this over the vascular leash and the flap which had been turned over onto the heel, we measured this to be 20 x 8 cm.  We then placed VAC over the proximal leg and the heel and bridge this.  VAC bolster was set to 125 mmHg.  We then wrapped the right leg with Curlex and placed a splint to immobilize the leg..  The donor site was treated with Integra thin skin followed by Xeroform, Telfa, ABD, Ace bandage.    The patient tolerated the procedure well and was awakened from anesthesia without any difficulties, she was transferred to the patient in stable condition, all needle counts were correct.     POST OP PLAN:  Ciara will remain an outpatient, we will remove the VAC bolster at the next follow-up.  She is instructed to sponge bath

## 2025-06-24 DIAGNOSIS — C43.9 MELANOMA OF SKIN (MULTI): ICD-10-CM

## 2025-07-07 ENCOUNTER — TUMOR BOARD CONFERENCE (OUTPATIENT)
Dept: HEMATOLOGY/ONCOLOGY | Facility: HOSPITAL | Age: 70
End: 2025-07-07
Payer: COMMERCIAL

## 2025-07-16 NOTE — TUMOR BOARD NOTE
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  S/p WLE with 2 cm margins showing an ulcerated invasive melanoma, estimated Breslow's depth 7.6 mm, not present at surgical margins, lymphovascular and perineural invasion seen, and SLNB showing lymph nodes with solitary atypical melanocytes, picometastatic melanoma  (< 0.1 mm) with no extracapsular extension (2/3).    Follow-up with medical oncology and s/p 1 year of pembrolizumab, ending 1/28/2025.     Now, presenting with new enlarging RLL sup segment nodule.     S/p PET CT on 7/17/2025 showed ***    Recommendation:      Review Multidisciplinary Cutaneous Oncology Conference recommendation with patient.  Continue routine follow up and total body skin exams with Dermatology.    Follow Up:  Oh Arciniega (Podiatry), Inna Toure, Juve Millan, Medical Oncology, Dermatology      History and Physical Exam  Dermatologic History:   69 y.o. female with a biopsy of the right plantar heel on 10- showing an ulcerated melanoma, acral type, Breslow: at least 1.3 mm, broadly transected on the deep margin.  S/p on 11- WLE with 2 cm margins showing an ulcerated invasive melanoma, estimated Breslow's depth 7.6 mm, not present at surgical margins, lymphovascular and perineural invasion are seen, right inguinal SLNB showing lymph nodes with solitary atypical melanocytes, picometastatic melanoma  (< 0.1 mm) with no extracapsular extension (2/2), and benign right popliteal SLNB (0/1).    Follow-up with medical oncology and s/p 1 year of  pembrolizumab, ending 1/28/2025.     Now, presenting with new enlarging RLL sup segment nodule.     S/p PET CT on 7/17/2025 showed ***      Past Medical History:  Breast cancer in 2010    Family History of DNS/MM:  Unknown    Skin:  There is a large darkly discolored lesion encompassing the plantar aspect of the heel with a fungating bleeding mass.    Lymphatic:  No palpable lymph nodes in the right inguinal or popliteal regions.      Pathology  Dermatopathology- LAB: [id number]  Collected 11/22/2023 12:47    A. RIGHT INGUINAL SENTINEL LYMPH NODE:   --ONE LYMPH NODE WITH A SOLITARY ATYPICAL MELANOCYTE, SEE COMMENT     COMMENT: Routine and immunostained sections show a solitary atypical melanocyte with multinucleation, nucleomegaly and pigmented cytoplasm abutting a capsular blood vessel positive for Melan-A, SOX10 and HMB45.  Given morphologic similarity to the patient's invasive melanoma (Part D), this isolated finding is interpreted as picometastatic melanoma  (< 0.1 mm) with no extracapsular extension.     B. RIGHT INGUINAL SENTINEL LYMPH NODE BIOPSY #2:   --ONE LYMPH NODE WITH A SOLITARY ATYPICAL MELANOCYTE, SEE COMMENT     COMMENT: Routine and immunostained sections show a solitary atypical subcapsular cell with nucleomegaly and increased cytoplasm positive for Melan-A, SOX10 and HMB45.  Given morphologic similarity to the patient's invasive melanoma (Part D), this isolated finding is interpreted as picometastatic melanoma  (< 0.1 mm) with no extracapsular extension.     C. RIGHT POPLITEAL SENTINEL LYMPH NODE:   --ONE LYMPH NODE, NEGATIVE FOR METASTATIC MELANOMA     COMMENT: Routine and immunostained sections including Melan-A, SOX10 and HMB45 were reviewed.     D. SKIN, RIGHT HEEL, EXCISION:   --MELANOMA INVASIVE, ESTIMATED BRESLOW'S DEPTH 7.6 MM, ULCERATED, NOT PRESENT AT SURGICAL MARGINS, SEE COMMENT AND SYNOPTIC REPORT     COMMENT: Sections show residual invasive and in-situ acral melanoma with broad  surface ulceration and an estimated Breslow's depth of 7.6 mm.  The invasive component is multinodular and has mixed epithelioid and spindle morphology.  Lymphovascular and perineural invasion are seen.     :  Dr. K Behrens    Procedure  Excision     [place] node(s) biopsy   Specimen Laterality  Right     Tumor Site  Skin of lower limb and hip: Heel        Histologic Type  Acral melanoma   Maximum Tumor (Breslow) Thickness (Millimeters)  7.6 mm   Macroscopic Satellite Nodule(s)  Not identified   Ulceration  Present   Extent of Ulceration (Millimeters)  13 mm   Anatomic ([place]) Level  V (Melanoma invades subcutis)   Mitotic Rate  6 mitoses per mm2   Microsatellite(s)  Not identified   Lymphovascular Invasion  Present   Neurotropism  Present   Tumor-Infiltrating Lymphocytes  Present, nonbrisk   Tumor Regression  Not identified   MARGINS     Margin Status for Invasive Melanoma  All margins negative for invasive melanoma   Closest Margin Location(s) to Invasive Melanoma  9-12-3:00 margin   Distance from Invasive Melanoma to Peripheral Margin  At least: 16 mm   Distance from Invasive Melanoma to Deep Margin  At least: 9 mm   Margin Status for Melanoma in situ  All margins negative for melanoma in situ   Distance from Melanoma in Situ to Peripheral Margin  At least: 10 mm   REGIONAL LYMPH NODES     Regional Lymph Node Status  Tumor present in regional lymph node(s)   Total Number of Lymph Nodes with Tumor  2   Number of [place] Lymph Nodes with Tumor  2   [place]an Site(s) with Tumor  Capsular and subcapsular   Size of Largest [place] Node Metastatic Deposit  Less than: 0.1 mm   Extranodal Extension  Not identified   Matted Nodes  Not identified   Total Number of Lymph Nodes Examined  3   Number of [place] Nodes Examined  3   PATHOLOGIC STAGE CLASSIFICATION (pTNM, AJCC 8th Edition)  Classification assigned in this report includes information from a prior procedure: [id number]   pT Category  pT4b   pN  Category  pN2a     ____________________________________________________________________________________________________________________________________    Derm Consult: [id number]  4 SLIDES, [facility], [id number], 10/16/23     SKIN AND SUBCUTANEOUS TISSUE, RIGHT PLANTAR HEEL, BIOPSY:  ULCER AND MALIGNANT MELANOMA, BRESLOW THICKNESS AT LEAST 1.3 MM, PRESENT ON THE DEEP AND PERIPHERAL MARGIN, SEE NOTE.     Note: Microscopic examination reveals a specimen that extends into the dermis. There is an ulcer and there are nests of atypical epithelioid cells with melanin pigmentation. These cells stain strongly with antibodies against SOX-10 and Melan-A, and the pigment stains with a [place] stain.     ** Electronically signed out by Gaudencio Woods MD **     MELANOMA OF THE SKIN: Biopsy   8th Edition - Protocol posted: 3/23/2022MELANOMA OF THE SKIN: BIOPSY - All Specimens  SPECIMEN   Procedure  Not specified   Specimen Laterality  Right   TUMOR   Tumor Site  Skin of lower limb and hip: Right plantar heel        Histologic Type  Acral melanoma   Maximum Tumor (Breslow) Thickness (Millimeters)  At least: 1.3 mm     Broadly transected on the deep margin.   Ulceration  Present   Extent of Ulceration (Millimeters)  4.5 mm   Anatomic ([place]) Level  At least level: IV     Broadly transected on the deep margin.   Mitotic Rate  2 mitoses per mm2   Microsatellite(s)  Not identified   Lymphovascular Invasion  Not identified   Neurotropism  Not identified   Tumor-Infiltrating Lymphocytes  Present, nonbrisk   Tumor Regression  Not identified   MARGINS     Margin Status for Invasive Melanoma  Invasive melanoma present at margin   Margin(s) Involved by Invasive Melanoma  Peripheral     Deep   Margin Status for Melanoma in situ  All margins negative for melanoma in situ   PATHOLOGIC STAGE CLASSIFICATION (pTNM, AJCC 8th Edition)     pT Category  pT2b        Microsatellite(s)  Not identified   Lymphovascular Invasion  Not identified   Neurotropism  Not identified   Tumor-Infiltrating Lymphocytes  Present, nonbrisk   Tumor Regression  Not identified   MARGINS     Margin Status for Invasive Melanoma  Invasive melanoma present at margin   Margin(s) Involved by Invasive Melanoma  Peripheral     Deep   Margin Status for Melanoma in situ  All margins negative for melanoma in situ   PATHOLOGIC STAGE CLASSIFICATION (pTNM, AJCC 8th Edition)     pT Category  pT2b       Radiology  PET Whole Body w/ CT  Result Date: July 17, 2025 17:36 EDT      Reason For Exam  Malignant melanoma of plantar aspect of right foot  REPORT  EXAM: PET Whole Body w/ CT  HISTORY: Malignant melanoma of plantar aspect of right foot pulmonary nodule.  COMPARISON: CT chest 6/9/2025. CT chest abdomen pelvis 2/17/2025. CT chest  5/7/2024.  TECHNIQUE:  15 mCi of F-18 FDG was administered intravenously. Blood sugar level at the  time of the injection: 120 mg/dL. At 60 minutes from injection, PET images were  obtained images from skull base to midthigh in the axial plane. Reformatted  images were performed in the sagittal and coronal planes. A low-dose,  noncontrast CT scan was performed for attenuation correction and anatomical  localization. A low dose, noncontrast and nondiagnostic CT scan was performed  for attenuation correction and anatomic localization. Injection of the right  hand  Mediastinal blood pool SUV max 2.7 using the patient's body weight as the  normalization method.  FINDINGS:  Head and neck: No abnormal activity  Chest wall and axilla: No abnormal activity.  CHEST: Mild activity within solid pulmonary parenchymal nodule superior segment  right lower lobe described previously measures 11 mm with standard uptake  PET Whole Body w/ CT CAITY JOINER L - 11-76-16  * Final Report *  Printed by: Colleen Meek Page 2 of 3  Printed on: 7/21/2025 11:09 EDT  values of 2.6. Mediastinal blood pool  activity 2.7. This nodule is mildly  larger than on 2/17/2025 CT chest examination and is new since 5/7/2024 exam.  The 6 mm pulmonary nodule solid within the left upper lobe is identified and is  unchanged since 2024 examination. No significant hypermetabolic activity is  identified..  No significant hilar or mediastinal lymphadenopathy  ABDOMEN: No abnormal hypermetabolic activity. No abnormal hypermetabolic  activity within the liver in the area of previously described suspected cyst  near the dome of the liver and hemangioma near the dome of the liver left lobe.  Retroperitoneum: No abnormal activity  Pelvis: No abnormal activity  Bones: No abnormal activity.  EXTREMITIES: No abnormal activity  Miscellaneous: None.  IMPRESSION:  Mildly hypermetabolic pulmonary nodule superior segment right lower lobe of the  lung described previously mildly larger than the 2/17/2025 examination and new  since the 5/7/2024 CT chest examination indeterminate. Neoplasm is not  excluded. Pulmonary consult recommended.  Non hypermetabolic 6 mm solid pulmonary nodule left upper lobe of the lung  unchanged from 2024 examination likely benign. Follow-up CT chest in one year  recommended without contrast.

## 2025-07-21 ENCOUNTER — TUMOR BOARD CONFERENCE (OUTPATIENT)
Dept: HEMATOLOGY/ONCOLOGY | Facility: HOSPITAL | Age: 70
End: 2025-07-21
Payer: COMMERCIAL

## 2025-07-25 LAB
BRAF EXON 15 RESULTS: NORMAL
ELECTRONICALLY SIGNED BY: NORMAL

## 2025-08-14 ENCOUNTER — HOSPITAL ENCOUNTER (OUTPATIENT)
Dept: RADIOLOGY | Facility: EXTERNAL LOCATION | Age: 70
Discharge: HOME | End: 2025-08-14

## 2025-08-26 ENCOUNTER — HOSPITAL ENCOUNTER (OUTPATIENT)
Dept: RADIOLOGY | Facility: HOSPITAL | Age: 70
Discharge: HOME | End: 2025-08-26
Payer: MEDICARE

## 2025-08-26 VITALS
HEART RATE: 70 BPM | DIASTOLIC BLOOD PRESSURE: 82 MMHG | OXYGEN SATURATION: 94 % | WEIGHT: 150 LBS | BODY MASS INDEX: 26.58 KG/M2 | TEMPERATURE: 97.5 F | RESPIRATION RATE: 16 BRPM | HEIGHT: 63 IN | SYSTOLIC BLOOD PRESSURE: 151 MMHG

## 2025-08-26 DIAGNOSIS — R91.1 SOLITARY PULMONARY NODULE: ICD-10-CM

## 2025-08-26 LAB
ANION GAP SERPL CALC-SCNC: 12 MMOL/L (ref 10–20)
BUN SERPL-MCNC: 13 MG/DL (ref 6–23)
CALCIUM SERPL-MCNC: 9.5 MG/DL (ref 8.6–10.3)
CHLORIDE SERPL-SCNC: 105 MMOL/L (ref 98–107)
CO2 SERPL-SCNC: 25 MMOL/L (ref 21–32)
CREAT SERPL-MCNC: 0.97 MG/DL (ref 0.5–1.05)
EGFRCR SERPLBLD CKD-EPI 2021: 63 ML/MIN/1.73M*2
ERYTHROCYTE [DISTWIDTH] IN BLOOD BY AUTOMATED COUNT: 13.2 % (ref 11.5–14.5)
GLUCOSE SERPL-MCNC: 114 MG/DL (ref 74–99)
HCT VFR BLD AUTO: 42.6 % (ref 36–46)
HGB BLD-MCNC: 14.1 G/DL (ref 12–16)
INR PPP: 0.9 (ref 0.9–1.1)
MCH RBC QN AUTO: 28.3 PG (ref 26–34)
MCHC RBC AUTO-ENTMCNC: 33.1 G/DL (ref 32–36)
MCV RBC AUTO: 85 FL (ref 80–100)
NRBC BLD-RTO: 0 /100 WBCS (ref 0–0)
PLATELET # BLD AUTO: 245 X10*3/UL (ref 150–450)
POTASSIUM SERPL-SCNC: 3.4 MMOL/L (ref 3.5–5.3)
PROTHROMBIN TIME: 9.9 SECONDS (ref 9.8–12.4)
RBC # BLD AUTO: 4.99 X10*6/UL (ref 4–5.2)
SODIUM SERPL-SCNC: 139 MMOL/L (ref 136–145)
WBC # BLD AUTO: 9.3 X10*3/UL (ref 4.4–11.3)

## 2025-08-26 PROCEDURE — 85027 COMPLETE CBC AUTOMATED: CPT | Performed by: RADIOLOGY

## 2025-08-26 PROCEDURE — 36415 COLL VENOUS BLD VENIPUNCTURE: CPT | Performed by: RADIOLOGY

## 2025-08-26 PROCEDURE — 71045 X-RAY EXAM CHEST 1 VIEW: CPT

## 2025-08-26 PROCEDURE — 85610 PROTHROMBIN TIME: CPT | Performed by: RADIOLOGY

## 2025-08-26 PROCEDURE — 99152 MOD SED SAME PHYS/QHP 5/>YRS: CPT

## 2025-08-26 PROCEDURE — 2500000005 HC RX 250 GENERAL PHARMACY W/O HCPCS: Performed by: RADIOLOGY

## 2025-08-26 PROCEDURE — 32408 CORE NDL BX LNG/MED PERQ: CPT

## 2025-08-26 PROCEDURE — 7100000001 HC RECOVERY ROOM TIME - INITIAL BASE CHARGE

## 2025-08-26 PROCEDURE — 7100000009 HC PHASE TWO TIME - INITIAL BASE CHARGE

## 2025-08-26 PROCEDURE — 7100000010 HC PHASE TWO TIME - EACH INCREMENTAL 1 MINUTE

## 2025-08-26 PROCEDURE — 80048 BASIC METABOLIC PNL TOTAL CA: CPT | Performed by: RADIOLOGY

## 2025-08-26 PROCEDURE — 7100000002 HC RECOVERY ROOM TIME - EACH INCREMENTAL 1 MINUTE

## 2025-08-26 PROCEDURE — 2500000004 HC RX 250 GENERAL PHARMACY W/ HCPCS (ALT 636 FOR OP/ED): Performed by: RADIOLOGY

## 2025-08-26 PROCEDURE — 99153 MOD SED SAME PHYS/QHP EA: CPT

## 2025-08-26 RX ORDER — FENTANYL CITRATE 50 UG/ML
INJECTION, SOLUTION INTRAMUSCULAR; INTRAVENOUS
Status: COMPLETED | OUTPATIENT
Start: 2025-08-26 | End: 2025-08-26

## 2025-08-26 RX ORDER — LIDOCAINE HYDROCHLORIDE 10 MG/ML
INJECTION, SOLUTION EPIDURAL; INFILTRATION; INTRACAUDAL; PERINEURAL
Status: COMPLETED | OUTPATIENT
Start: 2025-08-26 | End: 2025-08-26

## 2025-08-26 RX ORDER — MIDAZOLAM HYDROCHLORIDE 1 MG/ML
INJECTION, SOLUTION INTRAMUSCULAR; INTRAVENOUS
Status: COMPLETED | OUTPATIENT
Start: 2025-08-26 | End: 2025-08-26

## 2025-08-26 RX ADMIN — Medication 1 DOSE: at 08:10

## 2025-08-26 RX ADMIN — LIDOCAINE HYDROCHLORIDE 2 ML: 10 INJECTION, SOLUTION EPIDURAL; INFILTRATION; INTRACAUDAL; PERINEURAL at 08:34

## 2025-08-26 RX ADMIN — FENTANYL CITRATE 50 MCG: 50 INJECTION, SOLUTION INTRAMUSCULAR; INTRAVENOUS at 08:24

## 2025-08-26 RX ADMIN — LIDOCAINE HYDROCHLORIDE 2 ML: 10 INJECTION, SOLUTION EPIDURAL; INFILTRATION; INTRACAUDAL; PERINEURAL at 08:37

## 2025-08-26 RX ADMIN — MIDAZOLAM 2 MG: 1 INJECTION INTRAMUSCULAR; INTRAVENOUS at 08:24

## 2025-08-26 RX ADMIN — FENTANYL CITRATE 50 MCG: 50 INJECTION, SOLUTION INTRAMUSCULAR; INTRAVENOUS at 08:32

## 2025-08-26 RX ADMIN — LIDOCAINE HYDROCHLORIDE 2 ML: 10 INJECTION, SOLUTION EPIDURAL; INFILTRATION; INTRACAUDAL; PERINEURAL at 08:40

## 2025-08-26 ASSESSMENT — PAIN SCALES - GENERAL
PAINLEVEL_OUTOF10: 0 - NO PAIN

## 2025-08-26 ASSESSMENT — PAIN - FUNCTIONAL ASSESSMENT: PAIN_FUNCTIONAL_ASSESSMENT: 0-10

## 2025-09-03 LAB
LAB AP ASR DISCLAIMER: NORMAL
LABORATORY COMMENT REPORT: NORMAL
PATH REPORT.COMMENTS IMP SPEC: NORMAL
PATH REPORT.FINAL DX SPEC: NORMAL
PATH REPORT.GROSS SPEC: NORMAL
PATH REPORT.RELEVANT HX SPEC: NORMAL
PATH REPORT.TOTAL CANCER: NORMAL
RESIDENT REVIEW: NORMAL

## (undated) DEVICE — SUTURE, VICRYL, 2-0, 18 IN, UNDYED

## (undated) DEVICE — SPONGE, GAUZE, RADIOPAQUE, 12 PLY, 4 X 8 IN, STERILE, LF

## (undated) DEVICE — PACK, BASIC

## (undated) DEVICE — CORD, BIPOLAR, 2 PIN CONNECTING

## (undated) DEVICE — NEEDLE, HYPODERMIC, SAFETY, GLIDE, 18G X 1.5"

## (undated) DEVICE — DRAPE, INSTRUMENT, W/POUCH, STERI DRAPE, 7 X 11 IN, DISPOSABLE, STERILE

## (undated) DEVICE — SUTURE, VICRYL, 3-0, 27 IN, SH

## (undated) DEVICE — PREP TRAY, SKIN, SCRUB, DISP, STERILE

## (undated) DEVICE — DRESSING, WOUND, MEPITEL, 4X8

## (undated) DEVICE — DRESSING, NON-ADHERENT, TELFA, OUCHLESS, 3 X 4 IN, STERILE

## (undated) DEVICE — OINTMENT, TOPICAL, BACITRACIN

## (undated) DEVICE — HOLSTER, ELECTROSURGERY ACCESSORY, STERILE

## (undated) DEVICE — Device

## (undated) DEVICE — PACK, UNIVERSAL

## (undated) DEVICE — COVER, MAYO STAND, W/PAD, 23 IN, DISPOSABLE, PLASTIC, LF, STERILE

## (undated) DEVICE — BANDAGE, COFLEX, 6 X 5 YDS, FOAM TAN, STERILE, LF

## (undated) DEVICE — PROBE, KOVEN VASCULAR, SINGLE USE

## (undated) DEVICE — GOWN, SURGICAL, SMARTGOWN, XLARGE, STERILE

## (undated) DEVICE — DRAPE, TIBURON W/ADHESIVE, 19 X 30

## (undated) DEVICE — BLADE, DERMATOME, 1.25 X 4.25 IN, STERILE

## (undated) DEVICE — BANDAGE, GAUZE, 6 PLY, KERLIX, 4.5 IN X 4.1 YD, AMD, STERILE

## (undated) DEVICE — BANDAGE, ELASTIC, REINFORCED, ECONO-WRAP, 4 IN X 4.5 YD, LATEX

## (undated) DEVICE — DRESSING, NON-ADHERENT, TELFA, OUCHLESS, 3 X 8 IN, STERILE

## (undated) DEVICE — DRAPE, MICROSCOPE, W/REMOVABLE LENS

## (undated) DEVICE — SYRINGE, HYPODERMIC, LUER LOCK, 6 CC

## (undated) DEVICE — TIP, SUCTION, YANKAUER, FLEXIBLE

## (undated) DEVICE — DRESSING, ADAPTIC, NON-ADHERENT, 3 X 8 IN

## (undated) DEVICE — COUNTER, NEEDLE, FOAM BLOCK, W/MAGNET, W/BLADE GUARD, 10 COUNT, RED, LF

## (undated) DEVICE — COVER, PROBE, W/STERILE GEL, 3 X 96 IN (8X244CM)

## (undated) DEVICE — DRAPE, SHEET, LAPAROTOMY, INCISE, FENESTRATED, 105 X 115 IN, STERILE

## (undated) DEVICE — STAPLER, SKIN PROXIMATE, 35 WIDE

## (undated) DEVICE — STOCKINETTE, IMPERVIOUS, 12 X 48 IN, LF, STERILE

## (undated) DEVICE — NEEDLE, HYPODERMIC, REGULAR WALL, REGULAR BEVEL, 27 G X 1.25 IN

## (undated) DEVICE — SUTURE, CTD, VICRYL 3-0, UND, BR, SH-1

## (undated) DEVICE — SCALPEL, SURGICAL, W/BLADE, 10, STERILE

## (undated) DEVICE — CLIP, LIGATING, HORIZON, MEDIUM, TITANIUM

## (undated) DEVICE — TIP, SUCTION, FRAZIER, W/CONTROL VENT, 12 FR

## (undated) DEVICE — DRESSING, AQUACEL AG, HYDROFIBER W/SILVER, 3.5 X 12 IN

## (undated) DEVICE — SYRINGE, LUER LOCK, 12ML

## (undated) DEVICE — WOUND SYSTEM, DEBRIDEMENT & CLEANING, O.R DUOPAK

## (undated) DEVICE — DRESSING, NON-ADHERENT, TELFA, 3 X 8 IN, NS

## (undated) DEVICE — SOLUTION, IRRIGATION, SODIUM CHLORIDE 0.9%, 1000 ML, POUR BOTTLE

## (undated) DEVICE — CLEANER, WIPE, INSTRUMENT, 3.25 X 3.25 IN

## (undated) DEVICE — TOWEL, SURGICAL, NEURO, O/R, 16 X 26, BLUE, STERILE

## (undated) DEVICE — GLOVE, SURGICAL, PROTEXIS PI , 7.5, PF, LF

## (undated) DEVICE — DRAPE, SHEET, HAND, GUSSETTED, W/TABLE EXTENSION, 77 X 146 IN, DISPOSABLE, LF, STERILE

## (undated) DEVICE — NEEDLE, HYPODERMIC, REGULAR WALL, REGULAR BEVEL, 22 G X 1 IN

## (undated) DEVICE — DRAPE, SHEET, UTILITY, NON ABSORBENT, 18 X 26 IN, LF

## (undated) DEVICE — DRESSING, NON-ADHERENT, OIL EMULSION, CURITY, 3 X 8 IN, STERILE

## (undated) DEVICE — MARKER, SKIN, RULER AND LABEL PACK, CUSTOM

## (undated) DEVICE — APPLIER, LIGACLIP, MULTIPLE, SMALL 9-3/8IN

## (undated) DEVICE — LOOP, VESSEL, MAXI, RED

## (undated) DEVICE — ELECTRODE, ELECTROSURGICAL, GUARDED TIP

## (undated) DEVICE — SUTURE, MONOCRYL, 4-0, 18 IN, PS2, UNDYED

## (undated) DEVICE — APPLIER,  LIGACLIP MULTI CLIP, 30 MED 11 1/2

## (undated) DEVICE — EVACUATOR, WOUND, SUCTION, CLOSED, JACKSON-PRATT, 100 CC, SILICONE

## (undated) DEVICE — CARRIER, SKIN GRAFT, DERMACARRIER II, 3 X 8 IN, 1.5:1 RATIO, STERILE

## (undated) DEVICE — SUTURE, PROLENE, 3-0, 18 IN, PS2, BLUE

## (undated) DEVICE — BANDAGE, ELASTIC, REINFORCED, ECONO-WRAP, 6 IN X 4.5 YD, LATEX

## (undated) DEVICE — GLOVE, SURGICAL, PROTEXIS,  7.5, PF, LATEX

## (undated) DEVICE — DRESSING, GAUZE, PETROLATUM, STRIP OVERWRAP, XEROFORM, 5 X 9 IN, STERILE

## (undated) DEVICE — DRESSING, GAUZE, PETROLATUM, PATCH, XEROFORM, 1 X 8 IN, STERILE

## (undated) DEVICE — SUTURE, VICRYL, 3-0, 18 IN, UNDYED

## (undated) DEVICE — PREP TRAY, SKIN, DRY, W/GLOVES

## (undated) DEVICE — COVER, CART, 45 X 27 X 48 IN, CLEAR

## (undated) DEVICE — STAY SET, SURGICAL, 5MM SHARP HOOK, 8PK

## (undated) DEVICE — APPLICATOR, CHLORAPREP, W/ORANGE TINT, 26ML

## (undated) DEVICE — DRESSING, TELFA, 3X4

## (undated) DEVICE — MANIFOLD, 4 PORT NEPTUNE STANDARD

## (undated) DEVICE — TRAY, MINOR, SINGLE BASIN, STERILE

## (undated) DEVICE — SUTURE, ETHILON, 3-0, 18 IN, PS1, BLACK

## (undated) DEVICE — SPONGE, LAP, XRAY DECT, 18IN X 18IN, W/MASTER DMT, STERILE

## (undated) DEVICE — SUTURE, PLAIN, 4-0, 18 IN, PS2, YELLOW

## (undated) DEVICE — CLEANER, ELECTROSURGICAL, TIP, 5 X 5 CM, LF

## (undated) DEVICE — HEMOSTAT, ARISTA, ABSORBABLE, 3 GRAMS

## (undated) DEVICE — CANNULA, 27G X 22MM, ANTERIOR, CHAMBER, RYCROFT

## (undated) DEVICE — LOOP, VESSEL, MAXI, BLUE

## (undated) DEVICE — DRAPE, PAD, PREP, W/ 9 IN CUFF, 24 X 41, LF, NS

## (undated) DEVICE — SUTURE, SILK, 3-0, 30 IN, BR SH, BLACK

## (undated) DEVICE — BANDAGE, GAUZE, CONFORMING, KERLIX, 6 PLY, 4.5 IN X 4.1 YD

## (undated) DEVICE — ELECTRODE, ELECTROSURGICAL, BLADE, INSULATED, ENT/IMA, STERILE

## (undated) DEVICE — CORD, BIPOLAR,  12 FT, DISPOSABLE, LF

## (undated) DEVICE — NEEDLE, HYPODERMIC, MONOJECT, TRI-BEVELED, ANTI-CORING, 25 G X 1.25 IN, LUER LOCK HUB, RED